# Patient Record
Sex: FEMALE | Race: BLACK OR AFRICAN AMERICAN | Employment: UNEMPLOYED | URBAN - METROPOLITAN AREA
[De-identification: names, ages, dates, MRNs, and addresses within clinical notes are randomized per-mention and may not be internally consistent; named-entity substitution may affect disease eponyms.]

---

## 2018-06-03 ENCOUNTER — HOSPITAL ENCOUNTER (EMERGENCY)
Age: 58
Discharge: HOME | End: 2018-06-03
Attending: EMERGENCY MEDICINE

## 2018-06-03 ENCOUNTER — APPOINTMENT (OUTPATIENT)
Dept: GENERAL RADIOLOGY | Age: 58
End: 2018-06-03

## 2018-06-03 VITALS
TEMPERATURE: 97.4 F | HEIGHT: 68 IN | OXYGEN SATURATION: 100 % | HEART RATE: 68 BPM | WEIGHT: 189.8 LBS | RESPIRATION RATE: 16 BRPM | SYSTOLIC BLOOD PRESSURE: 161 MMHG | DIASTOLIC BLOOD PRESSURE: 112 MMHG | BODY MASS INDEX: 28.76 KG/M2
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
## 2018-06-03 PROCEDURE — 72100 X-RAY EXAM L-S SPINE 2/3 VWS: CPT

## 2018-06-03 PROCEDURE — 70110 X-RAY EXAM OF JAW 4/> VIEWS: CPT

## 2018-06-03 PROCEDURE — 72040 X-RAY EXAM NECK SPINE 2-3 VW: CPT

## 2018-06-03 PROCEDURE — 99283 EMERGENCY DEPT VISIT LOW MDM: CPT

## 2018-06-03 PROCEDURE — 73030 X-RAY EXAM OF SHOULDER: CPT

## 2018-06-03 RX ORDER — CYCLOBENZAPRINE HCL 10 MG
10 TABLET ORAL 3 TIMES DAILY PRN
Qty: 20 TABLET | Refills: 0 | Status: SHIPPED | OUTPATIENT
Start: 2018-06-03 | End: 2018-06-11 | Stop reason: SDUPTHER

## 2018-06-03 RX ORDER — HYDROCODONE BITARTRATE AND ACETAMINOPHEN 5; 325 MG/1; MG/1
1 TABLET ORAL EVERY 8 HOURS PRN
Qty: 15 TABLET | Refills: 0 | Status: SHIPPED | OUTPATIENT
Start: 2018-06-03 | End: 2018-06-08

## 2018-06-03 ASSESSMENT — ENCOUNTER SYMPTOMS
COLOR CHANGE: 0
CONSTIPATION: 0
DIARRHEA: 0
VOMITING: 0
SORE THROAT: 0
COUGH: 0
SHORTNESS OF BREATH: 0
ABDOMINAL PAIN: 0
RHINORRHEA: 0
SINUS PRESSURE: 0
WHEEZING: 0
NAUSEA: 0

## 2018-06-03 ASSESSMENT — PAIN DESCRIPTION - LOCATION: LOCATION: FACE;NECK

## 2018-06-03 ASSESSMENT — PAIN SCALES - GENERAL: PAINLEVEL_OUTOF10: 8

## 2018-06-03 ASSESSMENT — PAIN DESCRIPTION - DESCRIPTORS: DESCRIPTORS: SORE;TIGHTNESS

## 2018-06-03 ASSESSMENT — PAIN DESCRIPTION - FREQUENCY: FREQUENCY: CONTINUOUS

## 2018-06-03 ASSESSMENT — PAIN DESCRIPTION - PAIN TYPE: TYPE: ACUTE PAIN

## 2018-06-11 ENCOUNTER — OFFICE VISIT (OUTPATIENT)
Dept: FAMILY MEDICINE CLINIC | Age: 58
End: 2018-06-11

## 2018-06-11 ENCOUNTER — HOSPITAL ENCOUNTER (OUTPATIENT)
Age: 58
Setting detail: SPECIMEN
Discharge: HOME | End: 2018-06-11
Payer: COMMERCIAL

## 2018-06-11 VITALS
WEIGHT: 190 LBS | HEART RATE: 75 BPM | BODY MASS INDEX: 28.79 KG/M2 | HEIGHT: 68 IN | SYSTOLIC BLOOD PRESSURE: 128 MMHG | DIASTOLIC BLOOD PRESSURE: 82 MMHG | TEMPERATURE: 97.2 F

## 2018-06-11 DIAGNOSIS — I10 ESSENTIAL HYPERTENSION: Primary | ICD-10-CM

## 2018-06-11 DIAGNOSIS — J30.89 CHRONIC NON-SEASONAL ALLERGIC RHINITIS, UNSPECIFIED TRIGGER: ICD-10-CM

## 2018-06-11 DIAGNOSIS — I10 ESSENTIAL HYPERTENSION: ICD-10-CM

## 2018-06-11 DIAGNOSIS — M62.838 MUSCLE SPASM: ICD-10-CM

## 2018-06-11 DIAGNOSIS — Z12.11 COLON CANCER SCREENING: ICD-10-CM

## 2018-06-11 DIAGNOSIS — Z86.39 HISTORY OF HYPERTHYROIDISM: ICD-10-CM

## 2018-06-11 DIAGNOSIS — Z13.1 DIABETES MELLITUS SCREENING: ICD-10-CM

## 2018-06-11 DIAGNOSIS — E55.9 VITAMIN D DEFICIENCY: ICD-10-CM

## 2018-06-11 LAB
ALBUMIN SERPL-MCNC: 4.1 G/DL (ref 3.5–5.2)
ALBUMIN/GLOBULIN RATIO: 1.4 (ref 1–2.5)
ALP BLD-CCNC: 93 U/L (ref 35–104)
ALT SERPL-CCNC: 16 U/L (ref 5–33)
ANION GAP SERPL CALCULATED.3IONS-SCNC: 13 MMOL/L (ref 9–17)
AST SERPL-CCNC: 16 U/L
BILIRUB SERPL-MCNC: 0.33 MG/DL (ref 0.3–1.2)
BUN BLDV-MCNC: 12 MG/DL (ref 6–20)
BUN/CREAT BLD: NORMAL (ref 9–20)
CALCIUM SERPL-MCNC: 9.2 MG/DL (ref 8.6–10.4)
CHLORIDE BLD-SCNC: 107 MMOL/L (ref 98–107)
CO2: 23 MMOL/L (ref 20–31)
CREAT SERPL-MCNC: 0.57 MG/DL (ref 0.5–0.9)
ESTIMATED AVERAGE GLUCOSE: 85 MG/DL
GFR AFRICAN AMERICAN: >60 ML/MIN
GFR NON-AFRICAN AMERICAN: >60 ML/MIN
GFR SERPL CREATININE-BSD FRML MDRD: NORMAL ML/MIN/{1.73_M2}
GFR SERPL CREATININE-BSD FRML MDRD: NORMAL ML/MIN/{1.73_M2}
GLUCOSE BLD-MCNC: 90 MG/DL (ref 70–99)
HBA1C MFR BLD: 4.6 % (ref 4–6)
POTASSIUM SERPL-SCNC: 4.1 MMOL/L (ref 3.7–5.3)
SODIUM BLD-SCNC: 143 MMOL/L (ref 135–144)
T3 TOTAL: 103 NG/DL (ref 80–200)
T4 TOTAL: 5.7 UG/DL (ref 4.5–12)
TOTAL PROTEIN: 7.1 G/DL (ref 6.4–8.3)
TSH SERPL DL<=0.05 MIU/L-ACNC: 0.73 MIU/L (ref 0.3–5)

## 2018-06-11 PROCEDURE — 99203 OFFICE O/P NEW LOW 30 MIN: CPT | Performed by: FAMILY MEDICINE

## 2018-06-11 RX ORDER — CHOLECALCIFEROL (VITAMIN D3) 50 MCG
2000 TABLET ORAL DAILY
Qty: 30 TABLET | Refills: 3 | Status: SHIPPED | OUTPATIENT
Start: 2018-06-11 | End: 2018-07-17 | Stop reason: SDUPTHER

## 2018-06-11 RX ORDER — HYDROCHLOROTHIAZIDE 12.5 MG/1
12.5 CAPSULE, GELATIN COATED ORAL DAILY
Qty: 30 CAPSULE | Refills: 3 | Status: SHIPPED | OUTPATIENT
Start: 2018-06-11 | End: 2019-09-19 | Stop reason: SDUPTHER

## 2018-06-11 RX ORDER — IBUPROFEN 600 MG/1
600 TABLET ORAL EVERY 6 HOURS PRN
Qty: 30 TABLET | Refills: 0 | Status: SHIPPED | OUTPATIENT
Start: 2018-06-11 | End: 2018-07-17 | Stop reason: SDUPTHER

## 2018-06-11 RX ORDER — FLUTICASONE PROPIONATE 50 MCG
1 SPRAY, SUSPENSION (ML) NASAL DAILY
Qty: 1 BOTTLE | Refills: 3 | Status: SHIPPED | OUTPATIENT
Start: 2018-06-11 | End: 2020-06-02 | Stop reason: ALTCHOICE

## 2018-06-11 RX ORDER — CYCLOBENZAPRINE HCL 10 MG
10 TABLET ORAL 3 TIMES DAILY PRN
Qty: 20 TABLET | Refills: 0 | Status: SHIPPED | OUTPATIENT
Start: 2018-06-11 | End: 2018-06-21

## 2018-06-11 ASSESSMENT — PATIENT HEALTH QUESTIONNAIRE - PHQ9
2. FEELING DOWN, DEPRESSED OR HOPELESS: 0
SUM OF ALL RESPONSES TO PHQ QUESTIONS 1-9: 0
SUM OF ALL RESPONSES TO PHQ9 QUESTIONS 1 & 2: 0
1. LITTLE INTEREST OR PLEASURE IN DOING THINGS: 0

## 2018-06-11 ASSESSMENT — ENCOUNTER SYMPTOMS
COUGH: 0
WHEEZING: 0
BACK PAIN: 1
ABDOMINAL PAIN: 0
SHORTNESS OF BREATH: 0

## 2018-07-17 ENCOUNTER — HOSPITAL ENCOUNTER (OUTPATIENT)
Age: 58
Setting detail: SPECIMEN
Discharge: HOME | End: 2018-07-17
Payer: COMMERCIAL

## 2018-07-17 ENCOUNTER — OFFICE VISIT (OUTPATIENT)
Dept: FAMILY MEDICINE CLINIC | Age: 58
End: 2018-07-17

## 2018-07-17 VITALS
SYSTOLIC BLOOD PRESSURE: 128 MMHG | WEIGHT: 184.6 LBS | BODY MASS INDEX: 27.98 KG/M2 | TEMPERATURE: 97.3 F | HEIGHT: 68 IN | HEART RATE: 66 BPM | DIASTOLIC BLOOD PRESSURE: 85 MMHG

## 2018-07-17 DIAGNOSIS — E55.9 VITAMIN D DEFICIENCY: ICD-10-CM

## 2018-07-17 DIAGNOSIS — Z00.00 HEALTHCARE MAINTENANCE: ICD-10-CM

## 2018-07-17 DIAGNOSIS — I10 ESSENTIAL HYPERTENSION: Primary | ICD-10-CM

## 2018-07-17 LAB
HIV AG/AB: NONREACTIVE
VITAMIN D 25-HYDROXY: 19 NG/ML (ref 30–100)

## 2018-07-17 PROCEDURE — 99213 OFFICE O/P EST LOW 20 MIN: CPT | Performed by: STUDENT IN AN ORGANIZED HEALTH CARE EDUCATION/TRAINING PROGRAM

## 2018-07-17 RX ORDER — IBUPROFEN 600 MG/1
600 TABLET ORAL EVERY 6 HOURS PRN
Qty: 30 TABLET | Refills: 0 | Status: SHIPPED | OUTPATIENT
Start: 2018-07-17 | End: 2018-09-05

## 2018-07-17 RX ORDER — CHOLECALCIFEROL (VITAMIN D3) 50 MCG
2000 TABLET ORAL DAILY
Qty: 30 TABLET | Refills: 3 | Status: SHIPPED | OUTPATIENT
Start: 2018-07-17 | End: 2020-12-03

## 2018-07-17 ASSESSMENT — ENCOUNTER SYMPTOMS
COUGH: 0
ABDOMINAL PAIN: 0
NAUSEA: 0
SHORTNESS OF BREATH: 0
VOMITING: 0

## 2018-07-17 NOTE — PATIENT INSTRUCTIONS
Thank you for letting us take care of you today. We hope all your questions were addressed. If a question was overlooked or something else comes to mind after you return home, please contact a member of your Care Team listed below. Please make sure you have a routine office visit set up to follow-up on [address]. Your Care Team at [place] [address] is Team #3  Emily Quan MD (Faculty)  Daria Adame MD (Faculty  Cletahir Bhardwaj MD (Resident)  Darius Perez MD (Resident)   Anna Yan MD (Resident)  Socorro Valerio MD (Resident)  Joanne Figueroa MD (Resident)  BREN Waller., RADHA Marsh., Aryan Quinn ([facility])  Cristobal Ochoa RN, ([address] [facility])  Roselyn Shi, Ph.D., (Behavioral Services)  Gabriel Hyde, [address] [place] [address] (Clinical Pharmacist)     Office phone number: [phone]    If you need to get in right away due to illness, please be advised we have \"Same Day\" appointments available Monday-Friday. Please call us at [phone] option #3 to schedule your \"Same Day\" appointment.

## 2018-07-17 NOTE — PROGRESS NOTES
1-2x /month    Drug use: No    Sexual activity: Not Currently     Other Topics Concern    Not on file     Social History Narrative    No narrative on file       Objective:      /85 (Site: Right Arm, Position: Sitting, Cuff Size: Medium Adult) Comment: manual  Pulse 66   Temp 97.3 °F (36.3 °C) (Oral)   Ht 5' 8\" (1.727 m)   Wt 184 lb 9.6 oz (83.7 kg)   LMP 12/31/2013   BMI 28.07 kg/m²    BP Readings from Last 3 Encounters:   07/17/18 128/85   06/11/18 128/82   06/03/18 (!) 161/112       Physical Exam    General Appearance:  A&Ox3, NAD  HEENT: Head is NC/AT. Eyes sclera anicteric, no discharge. Ears intact TM B/L, NL external ear, no discharge. Nose NL nasal mucosa without swelling. Throat no tonsillar enlargement   Neck: Supple, no LAD, no thyromegaly  Lungs:  Clear to auscultation B/L. Cardiovascular:  NL S1/S2, RRR, no m,g,r. Abdomen: + BS, Soft, nontender, nondistended, no masses or organomegaly  Extremities: No cyanosis, clubbing or edema  Psych: NL affect     Assessment:     1. Essential hypertension    2. Vitamin D deficiency    3. Healthcare maintenance        Plan:      1. Essential hypertension  - Well controlled, continue current therapy     2. Vitamin D deficiency  - Cholecalciferol (VITAMIN D) 2000 units TABS tablet; Take 1 tablet by mouth daily  Dispense: 30 tablet; Refill: 3  - Vitamin D 25 Hydroxy; Future    3. Healthcare maintenance  - [facility] Digital Screen Bilateral [id number]; Future  - POCT FECAL IMMUNOCHEMICAL TEST (FIT); Future  - HIV Screen; Future      Tor Docker received counseling on the following healthy behaviors: nutrition, exercise and medication adherence  Reviewed prior labs and health maintenance  Continue current medications, diet and exercise. Discussed use, benefit, and side effects of prescribed medications. Barriers to medication compliance addressed.    Patient given educational materials - see patient instructions  Was a self-tracking handout given in paper form or via [employer]? No    Requested Prescriptions     Signed Prescriptions Disp Refills    ibuprofen (ADVIL;MOTRIN) 600 MG tablet 30 tablet 0     Sig: Take 1 tablet by mouth every 6 hours as needed for Pain    Cholecalciferol (VITAMIN D) 2000 units TABS tablet 30 tablet 3     Sig: Take 1 tablet by mouth daily       All patient questions answered. Patient voiced understanding. Quality Measures    Body mass index is 28.07 kg/m². Normal. Weight control planned discussed Healthy diet and regular exercise. BP: 128/85 (manual) Blood pressure is normal. Treatment plan consists of No treatment change needed. Lab Results   Component Value Date    LDLCHOLESTEROL 109 07/07/2015    (goal LDL reduction with dx if diabetes is 50% LDL reduction)      PHQ Scores 6/11/2018   PHQ2 Score 0   PHQ9 Score 0     Interpretation of Total Score Depression Severity: 1-4 = Minimal depression, 5-9 = Mild depression, 10-14 = Moderate depression, 15-19 = Moderately severe depression, 20-27 = Severe depression    Requested Prescriptions     Signed Prescriptions Disp Refills    ibuprofen (ADVIL;MOTRIN) 600 MG tablet 30 tablet 0     Sig: Take 1 tablet by mouth every 6 hours as needed for Pain    Cholecalciferol (VITAMIN D) 2000 units TABS tablet 30 tablet 3     Sig: Take 1 tablet by mouth daily       Medications Discontinued During This Encounter   Medication Reason    ibuprofen (ADVIL;MOTRIN) 600 MG tablet REORDER    Cholecalciferol (VITAMIN D) 2000 units TABS tablet Priscilalynnette Millard received counseling on the following healthy behaviors: nutrition, exercise and medication adherence    Discussed use, benefit, and side effects of prescribed medications. Barriers to medication compliance addressed. All patient questions answered. Pt voiced understanding. Return in about 6 months (around 1/17/2019) for HTN.

## 2018-07-17 NOTE — PROGRESS NOTES
Subjective:      Patient ID: Pauline Mercedes is a 62 y.o. female.     HPI    Review of Systems    Objective:   Physical Exam    Assessment:      ***      Plan:      ***

## 2018-07-17 NOTE — PROGRESS NOTES
Attending Physician Statement  I  have discussed the care of Eliseo Castaneda including pertinent history and exam findings with the resident. I agree with the assessment, plan and orders as documented by the resident. /85 (Site: Right Arm, Position: Sitting, Cuff Size: Medium Adult) Comment: manual  Pulse 66   Temp 97.3 °F (36.3 °C) (Oral)   Ht 5' 8\" (1.727 m)   Wt 184 lb 9.6 oz (83.7 kg)   LMP 12/31/2013   BMI 28.07 kg/m²    BP Readings from Last 3 Encounters:   07/17/18 128/85   06/11/18 128/82   06/03/18 (!) 161/112     Wt Readings from Last 3 Encounters:   07/17/18 184 lb 9.6 oz (83.7 kg)   06/11/18 190 lb (86.2 kg)   06/03/18 189 lb 12.8 oz (86.1 kg)          Diagnosis Orders   1. Essential hypertension     2. Vitamin D deficiency  Cholecalciferol (VITAMIN D) 2000 units TABS tablet    Vitamin D 25 Hydroxy   3. Healthcare maintenance  [facility] Digital Screen Bilateral [[id number]]    POCT FECAL IMMUNOCHEMICAL TEST (FIT)    HIV Screen       See orders. BP stable  HM addressed    Recheck office visit - 6 mo.      Sarahi Amezcua DO 7/17/2018 4:21 PM

## 2018-08-23 ENCOUNTER — TELEPHONE (OUTPATIENT)
Dept: FAMILY MEDICINE CLINIC | Age: 58
End: 2018-08-23

## 2018-08-23 NOTE — TELEPHONE ENCOUNTER
Patient came into office requesting medical statement for EOPA stating her health condition she would benefit from having a fan. Letter must be written on a letter head. Pt needs letter by tomorrow Friday 08 24 18, before 1:00PM. Please call pt when ready for pick-up , Thank you. Next Visit Date:  No future appointments.     Health Maintenance   Topic Date Due    Hepatitis C screen  1960    DTaP/Tdap/Td vaccine (1 - Tdap) 12/30/1979    Shingles Vaccine (1 of 2 - 2 Dose Series) 12/30/2010    Colon cancer screen colonoscopy  12/30/2010    Cervical cancer screen  11/13/2016    Breast cancer screen  07/07/2017    Flu vaccine (1) 09/01/2018    Potassium monitoring  06/11/2019    Creatinine monitoring  06/11/2019    Lipid screen  07/07/2020    HIV screen  Completed       Hemoglobin A1C (%)   Date Value   06/11/2018 4.6   07/07/2015 5.0   01/04/2014 4.7             ( goal A1C is < 7)   No results found for: LABMICR  LDL Cholesterol (mg/dL)   Date Value   07/07/2015 109   01/04/2014 133 (H)       (goal LDL is <100)   AST (U/L)   Date Value   06/11/2018 16     ALT (U/L)   Date Value   06/11/2018 16     BUN (mg/dL)   Date Value   06/11/2018 12     BP Readings from Last 3 Encounters:   07/17/18 128/85   06/11/18 128/82   06/03/18 (!) 161/112          (goal 120/80)    All Future Testing planned in [facility]  Lab Frequency Next Occurrence   [facility] Digital Screen Bilateral [[id number]] Once 01/17/2019   POCT FECAL IMMUNOCHEMICAL TEST (FIT) Once 07/17/2019               Patient Active Problem List:     Smoking     Hypertension     Uterine fibroid     Menorrhagia

## 2018-09-05 ENCOUNTER — APPOINTMENT (OUTPATIENT)
Dept: GENERAL RADIOLOGY | Age: 58
End: 2018-09-05
Payer: MEDICAID

## 2018-09-05 ENCOUNTER — HOSPITAL ENCOUNTER (EMERGENCY)
Age: 58
Discharge: HOME | End: 2018-09-05
Attending: EMERGENCY MEDICINE
Payer: MEDICAID

## 2018-09-05 ENCOUNTER — APPOINTMENT (OUTPATIENT)
Dept: CT IMAGING | Age: 58
End: 2018-09-05
Payer: MEDICAID

## 2018-09-05 VITALS
OXYGEN SATURATION: 99 % | RESPIRATION RATE: 14 BRPM | WEIGHT: 183.6 LBS | HEIGHT: 68 IN | HEART RATE: 70 BPM | BODY MASS INDEX: 27.83 KG/M2 | TEMPERATURE: 98.2 F | SYSTOLIC BLOOD PRESSURE: 146 MMHG | DIASTOLIC BLOOD PRESSURE: 92 MMHG

## 2018-09-05 DIAGNOSIS — S00.83XA FACIAL CONTUSION, INITIAL ENCOUNTER: ICD-10-CM

## 2018-09-05 DIAGNOSIS — M54.41 ACUTE LOW BACK PAIN WITH RIGHT-SIDED SCIATICA, UNSPECIFIED BACK PAIN LATERALITY: ICD-10-CM
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
## 2018-09-05 DIAGNOSIS — S80.811A ABRASION OF RIGHT LOWER EXTREMITY, INITIAL ENCOUNTER: ICD-10-CM

## 2018-09-05 DIAGNOSIS — S80.11XA CONTUSION OF RIGHT LOWER LEG, INITIAL ENCOUNTER: ICD-10-CM

## 2018-09-05 PROCEDURE — 99284 EMERGENCY DEPT VISIT MOD MDM: CPT

## 2018-09-05 PROCEDURE — 73590 X-RAY EXAM OF LOWER LEG: CPT

## 2018-09-05 PROCEDURE — 72100 X-RAY EXAM L-S SPINE 2/3 VWS: CPT

## 2018-09-05 PROCEDURE — 70486 CT MAXILLOFACIAL W/O DYE: CPT

## 2018-09-05 RX ORDER — IBUPROFEN 600 MG/1
600 TABLET ORAL EVERY 8 HOURS PRN
Qty: 15 TABLET | Refills: 0 | Status: SHIPPED | OUTPATIENT
Start: 2018-09-05 | End: 2020-06-02 | Stop reason: ALTCHOICE

## 2018-09-05 RX ORDER — CYCLOBENZAPRINE HCL 10 MG
10 TABLET ORAL 3 TIMES DAILY PRN
Qty: 20 TABLET | Refills: 0 | Status: SHIPPED | OUTPATIENT
Start: 2018-09-05 | End: 2018-09-15

## 2018-09-05 ASSESSMENT — PAIN DESCRIPTION - ORIENTATION: ORIENTATION: RIGHT

## 2018-09-05 ASSESSMENT — ENCOUNTER SYMPTOMS
PHOTOPHOBIA: 0
ABDOMINAL PAIN: 0
COLOR CHANGE: 0
FACIAL SWELLING: 1
EYE PAIN: 0
BACK PAIN: 1
SHORTNESS OF BREATH: 0
COUGH: 0
NAUSEA: 0
VOMITING: 0

## 2018-09-05 ASSESSMENT — PAIN SCALES - GENERAL: PAINLEVEL_OUTOF10: 8

## 2018-09-05 ASSESSMENT — PAIN DESCRIPTION - PAIN TYPE: TYPE: ACUTE PAIN
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
## 2019-09-19 DIAGNOSIS — I10 ESSENTIAL HYPERTENSION: ICD-10-CM

## 2019-09-23 RX ORDER — HYDROCHLOROTHIAZIDE 12.5 MG/1
CAPSULE, GELATIN COATED ORAL
Qty: 30 CAPSULE | Refills: 3 | Status: SHIPPED | OUTPATIENT
Start: 2019-09-23 | End: 2019-12-05 | Stop reason: SDUPTHER

## 2019-12-04 DIAGNOSIS — I10 ESSENTIAL HYPERTENSION: ICD-10-CM

## 2019-12-05 RX ORDER — HYDROCHLOROTHIAZIDE 12.5 MG/1
CAPSULE, GELATIN COATED ORAL
Qty: 30 CAPSULE | Refills: 3 | Status: SHIPPED | OUTPATIENT
Start: 2019-12-05 | End: 2020-06-02 | Stop reason: ALTCHOICE

## 2020-06-02 ENCOUNTER — OFFICE VISIT (OUTPATIENT)
Dept: FAMILY MEDICINE CLINIC | Age: 60
End: 2020-06-02

## 2020-06-02 VITALS
TEMPERATURE: 96.9 F | BODY MASS INDEX: 29.7 KG/M2 | DIASTOLIC BLOOD PRESSURE: 117 MMHG | HEART RATE: 84 BPM | SYSTOLIC BLOOD PRESSURE: 177 MMHG | WEIGHT: 196 LBS | HEIGHT: 68 IN

## 2020-06-02 PROCEDURE — 99213 OFFICE O/P EST LOW 20 MIN: CPT | Performed by: STUDENT IN AN ORGANIZED HEALTH CARE EDUCATION/TRAINING PROGRAM

## 2020-06-02 RX ORDER — HYDROCHLOROTHIAZIDE 25 MG/1
25 TABLET ORAL EVERY MORNING
Qty: 30 TABLET | Refills: 3 | Status: SHIPPED | OUTPATIENT
Start: 2020-06-02 | End: 2020-10-13

## 2020-06-02 RX ORDER — HYDROCHLOROTHIAZIDE 12.5 MG/1
CAPSULE, GELATIN COATED ORAL
Qty: 30 CAPSULE | Refills: 3 | Status: CANCELLED | OUTPATIENT
Start: 2020-06-02

## 2020-06-02 ASSESSMENT — ENCOUNTER SYMPTOMS
ABDOMINAL PAIN: 0
COUGH: 0
SHORTNESS OF BREATH: 0

## 2020-06-02 ASSESSMENT — PATIENT HEALTH QUESTIONNAIRE - PHQ9
SUM OF ALL RESPONSES TO PHQ9 QUESTIONS 1 & 2: 0
SUM OF ALL RESPONSES TO PHQ QUESTIONS 1-9: 0
1. LITTLE INTEREST OR PLEASURE IN DOING THINGS: 0
SUM OF ALL RESPONSES TO PHQ QUESTIONS 1-9: 0
2. FEELING DOWN, DEPRESSED OR HOPELESS: 0

## 2020-06-02 NOTE — PROGRESS NOTES
Attending Physician Statement  I have discussed the care of [address] pertinent history and exam findings,  with the resident. I have reviewed the key elements of all parts of the encounter with the resident. I agree with the assessment, plan and orders as documented by the resident. (GE Modifier)    HTN- HCTZ 25 mg restarted/ NV in 1 week.   HM- ordered
organomegaly  Extremities: No cyanosis, clubbing or edema    Assessment:     1. Essential hypertension    2. Diabetes mellitus screening    3. Colon cancer screening        Plan:      1. Essential hypertension  - Basic Metabolic Panel  - hydroCHLOROthiazide (HYDRODIURIL) 25 MG tablet; Take 1 tablet by mouth every morning  Dispense: 30 tablet; Refill: 3  -Discussed with patient need for adding second medication. At this time patient would like to hold off. She reports she will increase her hydrochlorothiazide return in 4 weeks and at that time if blood pressure still elevated she is agreeable to add a medication. Consider adding Norvasc. 2. Diabetes mellitus screening  - Hemoglobin A1C; Future    3. Colon cancer screening  - Cologuard (For External Results Only); Future      Porsha Parson received counseling on the following healthy behaviors: nutrition, exercise and medication adherence  Reviewed prior labs and health maintenance  Continue current medications, diet and exercise. Discussed use, benefit, and side effects of prescribed medications. Barriers to medication compliance addressed. Patient given educational materials - see patient instructions  Was a self-tracking handout given in paper form or via [employer]? No    Requested Prescriptions     Signed Prescriptions Disp Refills    hydroCHLOROthiazide (HYDRODIURIL) 25 MG tablet 30 tablet 3     Sig: Take 1 tablet by mouth every morning       All patient questions answered. Patient voiced understanding. Quality Measures    Body mass index is 29.8 kg/m². Elevated. Weight control planned discussed Healthy diet and regular exercise. BP: (!) 177/117 Blood pressure is high. Treatment plan consists of Antihypertensive Medication Increased.     Lab Results   Component Value Date    LDLCHOLESTEROL 109 07/07/2015    (goal LDL reduction with dx if diabetes is 50% LDL reduction)      PHQ Scores 6/2/2020 6/11/2018   PHQ2 Score 0 0   PHQ9 Score 0 0

## 2020-06-30 ENCOUNTER — OFFICE VISIT (OUTPATIENT)
Dept: FAMILY MEDICINE CLINIC | Age: 60
End: 2020-06-30

## 2020-06-30 VITALS
TEMPERATURE: 94 F | SYSTOLIC BLOOD PRESSURE: 128 MMHG | DIASTOLIC BLOOD PRESSURE: 82 MMHG | WEIGHT: 191 LBS | BODY MASS INDEX: 28.95 KG/M2 | HEIGHT: 68 IN | HEART RATE: 86 BPM

## 2020-06-30 PROCEDURE — 99213 OFFICE O/P EST LOW 20 MIN: CPT | Performed by: STUDENT IN AN ORGANIZED HEALTH CARE EDUCATION/TRAINING PROGRAM

## 2020-06-30 RX ORDER — ANTACID TABLETS 648 MG/1
2 TABLET, CHEWABLE ORAL 2 TIMES DAILY
Qty: 120 TABLET | Refills: 5 | Status: SHIPPED | OUTPATIENT
Start: 2020-06-30 | End: 2020-12-27

## 2020-06-30 RX ORDER — PHENYLEPHRINE HYDROCHLORIDE 10 MG/1
1 TABLET, COATED ORAL NIGHTLY
Qty: 2 EACH | Refills: 0 | Status: SHIPPED | OUTPATIENT
Start: 2020-06-30 | End: 2022-09-22

## 2020-06-30 ASSESSMENT — ENCOUNTER SYMPTOMS
VOMITING: 0
SHORTNESS OF BREATH: 0
CHEST TIGHTNESS: 0
ABDOMINAL PAIN: 0
CONSTIPATION: 0
NAUSEA: 0
SORE THROAT: 0
DIARRHEA: 0
COUGH: 0

## 2020-06-30 NOTE — PROGRESS NOTES
(Temporal)   Ht 5' 7.99\" (1.727 m)   Wt 191 lb (86.6 kg)   LMP 12/31/2013   BMI 29.05 kg/m²    BP Readings from Last 3 Encounters:   06/30/20 128/82   06/02/20 (!) 177/117   09/05/18 (!) 146/92     Physical Exam  Vitals signs and nursing note reviewed. Constitutional:       Appearance: She is well-developed. Cardiovascular:      Rate and Rhythm: Normal rate and regular rhythm. Heart sounds: Normal heart sounds. No murmur. No friction rub. No gallop. Pulmonary:      Effort: Pulmonary effort is normal. No respiratory distress. Breath sounds: Normal breath sounds. No wheezing or rales. Chest:      Chest wall: No tenderness. Abdominal:      General: Bowel sounds are normal. There is no distension. Palpations: Abdomen is soft. There is no mass. Tenderness: There is no abdominal tenderness. There is no guarding. Musculoskeletal:      Comments: Negative Tinel and Phalen sign   Skin:     Capillary Refill: Capillary refill takes less than 2 seconds. Neurological:      Mental Status: She is alert and oriented to person, place, and time. Lab Results   Component Value Date    WBC 7.1 07/07/2015    HGB 12.6 07/07/2015    HCT 38.9 07/07/2015     07/07/2015    CHOL 222 (H) 07/07/2015    TRIG 53 07/07/2015     07/07/2015    ALT 16 06/11/2018    AST 16 06/11/2018     06/11/2018    K 4.1 06/11/2018     06/11/2018    CREATININE 0.57 06/11/2018    BUN 12 06/11/2018    CO2 23 06/11/2018    TSH 0.73 06/11/2018    LABA1C 4.6 06/11/2018     Lab Results   Component Value Date    CALCIUM 9.2 06/11/2018     Lab Results   Component Value Date    LDLCHOLESTEROL 109 07/07/2015       Assessment and Plan:    1. Essential hypertension  -Continue with medication as prescribed  -Continue with diet devoid of fried and fatty foods.  -Continue with daily exercise, 30 minutes moderate intensity 5 times a week.     2. Vitamin D deficiency  - calcium carbonate 648 MG TABS; Take 2 tablets by mouth 2 times daily  Dispense: 120 tablet; Refill: 5    3. Screening for hyperlipidemia  - Lipid Panel; Future    4. Encounter for screening mammogram for breast cancer  - [facility] Digital Screen Bilateral [[id number]]; Future    5. Need for prophylactic vaccination and inoculation against varicella  - zoster recombinant adjuvanted vaccine [facility]) 50 MCG/0.5ML SUSR injection; Inject 0.5 mLs into the muscle once for 1 dose 50 MCG IM then repeat 2-6 months. Dispense: 1 each; Refill: 1    6. Bilateral carpal tunnel syndrome  - Elastic Bandages & Supports (CARPAL TUNNEL WRIST STABILIZER) MISC; 1 Package by Does not apply route nightly  Dispense: 2 each; Refill: 0          Requested Prescriptions     Signed Prescriptions Disp Refills    zoster recombinant adjuvanted vaccine (SHINGRIX) 50 MCG/0.5ML SUSR injection 1 each 1     Sig: Inject 0.5 mLs into the muscle once for 1 dose 50 MCG IM then repeat 2-6 months.  calcium carbonate 648 MG TABS 120 tablet 5     Sig: Take 2 tablets by mouth 2 times daily    Elastic Bandages & Supports (CARPAL TUNNEL WRIST STABILIZER) MISC 2 each 0     Si Package by Does not apply route nightly       There are no discontinued medications. Return in about 2 months (around 2020) for HTN F/U. Blake Soto received counseling on the following healthy behaviors: nutrition and exercise  Reviewed prior labs and health maintenance  Continue current medications, diet and exercise. Discussed use, benefit, and side effects of prescribed medications. Barriers to medication compliance addressed. Patient given educational materials - see patient instructions  Was a self-tracking handout given in paper form or via [employer]? No:     Requested Prescriptions     Signed Prescriptions Disp Refills    zoster recombinant adjuvanted vaccine (SHINGRIX) 50 MCG/0.5ML SUSR injection 1 each 1     Sig: Inject 0.5 mLs into the muscle once for 1 dose 50 MCG IM then repeat 2-6 months.     calcium

## 2020-10-12 NOTE — TELEPHONE ENCOUNTER
Last visit: 06/30/20  Last Med refill:   Does patient have enough medication for 72 hours:     Next Visit Date:  No future appointments.     Health Maintenance   Topic Date Due    Hepatitis C screen  1960    DTaP/Tdap/Td vaccine (1 - Tdap) 12/30/1979    Shingles Vaccine (1 of 2) 12/30/2010    Colon cancer screen colonoscopy  12/30/2010    Breast cancer screen  07/07/2017    Potassium monitoring  06/11/2019    Creatinine monitoring  06/11/2019    Lipid screen  07/07/2020    Flu vaccine (1) 09/01/2020    Cervical cancer screen  06/30/2021 (Originally 11/13/2016)    HIV screen  Completed    Hepatitis A vaccine  Aged Out    Hepatitis B vaccine  Aged Out    Hib vaccine  Aged Out    Meningococcal (ACWY) vaccine  Aged Out    Pneumococcal 0-64 years Vaccine  Aged Out       Hemoglobin A1C (%)   Date Value   06/11/2018 4.6   07/07/2015 5.0   01/04/2014 4.7             ( goal A1C is < 7)   No results found for: LABMICR  LDL Cholesterol (mg/dL)   Date Value   07/07/2015 109   01/04/2014 133 (H)       (goal LDL is <100)   AST (U/L)   Date Value   06/11/2018 16     ALT (U/L)   Date Value   06/11/2018 16     BUN (mg/dL)   Date Value   06/11/2018 12     BP Readings from Last 3 Encounters:   06/30/20 128/82   06/02/20 (!) 177/117   09/05/18 (!) 146/92          (goal 120/80)    All Future Testing planned in [facility]  Lab Frequency Next Occurrence   Hemoglobin A1C Once 06/02/2021   Cologuard (For External Results Only) Once 06/02/2021   [facility] Digital Screen Bilateral [[id number]] Once 06/30/2021   Lipid Panel Once 06/30/2021               Patient Active Problem List:     Smoking     Hypertension     Uterine fibroid     Menorrhagia

## 2020-10-13 RX ORDER — HYDROCHLOROTHIAZIDE 25 MG/1
TABLET ORAL
Qty: 30 TABLET | Refills: 0 | Status: SHIPPED | OUTPATIENT
Start: 2020-10-13 | End: 2020-11-27 | Stop reason: SDUPTHER

## 2020-10-15 ENCOUNTER — TELEPHONE (OUTPATIENT)
Dept: FAMILY MEDICINE CLINIC | Age: 60
End: 2020-10-15

## 2020-11-27 NOTE — TELEPHONE ENCOUNTER
Patient is requesting a refill to last at least until her appointment on Thursday for her blood pressure medication. That she is completely out and has been out of the medication for about a week now. She was able to verify the name and dosage of the medication. Would like a call if that can be sent over to her pharmacy or not please.      Phone: [phone]    LOV: 06/30/2020  NOV: 12/03/2020    [facility] on [id number].

## 2020-11-30 RX ORDER — HYDROCHLOROTHIAZIDE 25 MG/1
TABLET ORAL
Qty: 30 TABLET | Refills: 5 | Status: SHIPPED | OUTPATIENT
Start: 2020-11-30 | End: 2020-12-02 | Stop reason: SDUPTHER

## 2020-12-02 RX ORDER — HYDROCHLOROTHIAZIDE 25 MG/1
TABLET ORAL
Qty: 30 TABLET | Refills: 5 | Status: SHIPPED | OUTPATIENT
Start: 2020-12-02 | End: 2021-08-30 | Stop reason: SDUPTHER

## 2020-12-02 NOTE — TELEPHONE ENCOUNTER
Last visit:   Last Med refill:   Does patient have enough medication for 72 hours: No:     Next Visit Date:  Future Appointments   Date Time Provider Jose R Ortiz   12/3/2020  8:30 AM Sonia Hatch MD [address] Maintenance   Topic Date Due    Hepatitis C screen  1960    DTaP/Tdap/Td vaccine (1 - Tdap) 12/30/1979    Shingles Vaccine (1 of 2) 12/30/2010    Colon cancer screen colonoscopy  12/30/2010    Breast cancer screen  07/07/2017    Potassium monitoring  06/11/2019    Creatinine monitoring  06/11/2019    Lipid screen  07/07/2020    Flu vaccine (1) 09/01/2020    Cervical cancer screen  06/30/2021 (Originally 11/13/2016)    HIV screen  Completed    Hepatitis A vaccine  Aged Out    Hepatitis B vaccine  Aged Out    Hib vaccine  Aged Out    Meningococcal (ACWY) vaccine  Aged Out    Pneumococcal 0-64 years Vaccine  Aged Out       Hemoglobin A1C (%)   Date Value   06/11/2018 4.6   07/07/2015 5.0   01/04/2014 4.7             ( goal A1C is < 7)   No results found for: LABMICR  LDL Cholesterol (mg/dL)   Date Value   07/07/2015 109   01/04/2014 133 (H)       (goal LDL is <100)   AST (U/L)   Date Value   06/11/2018 16     ALT (U/L)   Date Value   06/11/2018 16     BUN (mg/dL)   Date Value   06/11/2018 12     BP Readings from Last 3 Encounters:   06/30/20 128/82   06/02/20 (!) 177/117   09/05/18 (!) 146/92          (goal 120/80)    All Future Testing planned in [facility]  Lab Frequency Next Occurrence   Hemoglobin A1C Once 06/02/2021   Cologuard (For External Results Only) Once 06/02/2021   [facility] Digital Screen Bilateral [[id number]] Once 06/30/2021   Lipid Panel Once 06/30/2021               Patient Active Problem List:     Smoking     Hypertension     Uterine fibroid     Menorrhagia           Please address the medication refill and close the encounter. If I can be of assistance, please route to the applicable pool. Thank you.

## 2020-12-03 ENCOUNTER — OFFICE VISIT (OUTPATIENT)
Dept: FAMILY MEDICINE CLINIC | Age: 60
End: 2020-12-03
Payer: MEDICAID

## 2020-12-03 VITALS
SYSTOLIC BLOOD PRESSURE: 130 MMHG | WEIGHT: 192.6 LBS | BODY MASS INDEX: 29.29 KG/M2 | DIASTOLIC BLOOD PRESSURE: 88 MMHG | TEMPERATURE: 96.8 F | HEART RATE: 87 BPM

## 2020-12-03 PROCEDURE — 99213 OFFICE O/P EST LOW 20 MIN: CPT | Performed by: STUDENT IN AN ORGANIZED HEALTH CARE EDUCATION/TRAINING PROGRAM

## 2020-12-03 PROCEDURE — 99211 OFF/OP EST MAY X REQ PHY/QHP: CPT | Performed by: STUDENT IN AN ORGANIZED HEALTH CARE EDUCATION/TRAINING PROGRAM

## 2020-12-03 RX ORDER — SULFAMETHOXAZOLE AND TRIMETHOPRIM 800; 160 MG/1; MG/1
1 TABLET ORAL 2 TIMES DAILY
Qty: 20 TABLET | Refills: 0 | Status: SHIPPED | OUTPATIENT
Start: 2020-12-03 | End: 2020-12-13

## 2020-12-03 ASSESSMENT — ENCOUNTER SYMPTOMS
SINUS PRESSURE: 1
SHORTNESS OF BREATH: 0
COUGH: 0
BACK PAIN: 0
SORE THROAT: 0

## 2020-12-03 NOTE — PATIENT INSTRUCTIONS
Thank you for letting us take care of you today. We hope all your questions were addressed. If a question was overlooked or something else comes to mind after you return home, please contact a member of your Care Team listed below. Your Care Team at [facility] is Team #3  Kathleen Christine MD (Faculty)  Jad Medel MD (Faculty  Nataliayao Vital MD (Resident)  Jaleesa Herrera (Resident)   Michelle Wooten MD (Resident)  BREN Alvarez., RADHA Cifuentes., RADHA Tadeo ([address])  Tuanfred Marcus, [facility] ([address] [facility])    Vida Tenorio, [facility] (Clinical Pharmacist)     Office phone number: [phone]    If you need to get in right away due to illness, please be advised we have \"Same Day\" appointments available Monday-Friday. Please call us at [phone] option #3 to schedule your \"Same Day\" appointment.

## 2020-12-03 NOTE — PROGRESS NOTES
weakness. Review of Systems   Constitutional: Negative for chills, fatigue and fever. HENT: Positive for congestion and sinus pressure. Negative for hearing loss, sneezing, sore throat and tinnitus. Eyes: Negative for visual disturbance. Respiratory: Negative for cough and shortness of breath. Cardiovascular: Negative for chest pain and palpitations. Musculoskeletal: Negative for back pain and neck pain. Neurological: Negative for syncope, weakness, numbness and headaches. Objective:    /88 Comment: manually  Pulse 87   Temp 96.8 °F (36 °C) (Temporal)   Wt 192 lb 9.6 oz (87.4 kg)   LMP 12/31/2013   BMI 29.29 kg/m²    BP Readings from Last 3 Encounters:   12/03/20 130/88   06/30/20 128/82   06/02/20 (!) 177/117     Physical Exam  HENT:      Right Ear: Tympanic membrane, ear canal and external ear normal. There is no impacted cerumen. Nose: No congestion or rhinorrhea. Eyes:      Pupils: Pupils are equal, round, and reactive to light. Cardiovascular:      Rate and Rhythm: Normal rate and regular rhythm. Heart sounds: No murmur. No friction rub. No gallop. Pulmonary:      Breath sounds: No wheezing, rhonchi or rales. Musculoskeletal:         General: No signs of injury. Comments: Right arm- negative TTP. Normal ROM. Sensation intact   Skin:     Capillary Refill: Capillary refill takes less than 2 seconds. Neurological:      General: No focal deficit present. Mental Status: She is alert and oriented to person, place, and time.       Comments: Strength 5/5 throughout   Psychiatric:         Mood and Affect: Mood normal.           Lab Results   Component Value Date    WBC 7.1 07/07/2015    HGB 12.6 07/07/2015    HCT 38.9 07/07/2015     07/07/2015    CHOL 222 (H) 07/07/2015    TRIG 53 07/07/2015     07/07/2015    ALT 16 06/11/2018    AST 16 06/11/2018     06/11/2018    K 4.1 06/11/2018     06/11/2018    CREATININE 0.57 06/11/2018 exercise. BP: 130/88(manually) Blood pressure is normal. Treatment plan consists of No treatment change needed.     Lab Results   Component Value Date    LDLCHOLESTEROL 109 07/07/2015    (goal LDL reduction with dx if diabetes is 50% LDL reduction)      PHQ Scores 6/2/2020 6/11/2018   PHQ2 Score 0 0   PHQ9 Score 0 0     Interpretation of Total Score Depression Severity: 1-4 = Minimal depression, 5-9 = Mild depression, 10-14 = Moderate depression, 15-19 = Moderately severe depression, 20-27 = Severe depression

## 2020-12-03 NOTE — PROGRESS NOTES
Attending Physician Statement    Wt Readings from Last 3 Encounters:   12/03/20 192 lb 9.6 oz (87.4 kg)   06/30/20 191 lb (86.6 kg)   06/02/20 196 lb (88.9 kg)     Temp Readings from Last 3 Encounters:   12/03/20 96.8 °F (36 °C) (Temporal)   06/30/20 94 °F (34.4 °C) (Temporal)   06/02/20 96.9 °F (36.1 °C)     BP Readings from Last 3 Encounters:   12/03/20 130/88   06/30/20 128/82   06/02/20 (!) 177/117     Pulse Readings from Last 3 Encounters:   12/03/20 87   06/30/20 86   06/02/20 84       I have discussed the care of Kareem Hoffman  including pertinent history and exam findings,  with the resident. I have seen and examined the patient and the key elements of all parts of the encounter have been performed by me. I agree with the assessment, plan and orders as documented by the resident.   (GC Modifier)

## 2021-08-30 DIAGNOSIS — I10 ESSENTIAL HYPERTENSION: ICD-10-CM

## 2021-08-30 RX ORDER — HYDROCHLOROTHIAZIDE 25 MG/1
TABLET ORAL
Qty: 30 TABLET | Refills: 5 | Status: SHIPPED | OUTPATIENT
Start: 2021-08-30 | End: 2022-09-22

## 2021-08-30 NOTE — TELEPHONE ENCOUNTER
Please address the medication refill and close the encounter. If I can be of assistance, please route to the applicable pool. Thank you. Last visit: 12/03/2020  Last Med refill:   Does patient have enough medication for 72 hours: No: Left message for patient to call office to schedule and appointment. Next Visit Date:  No future appointments.     Health Maintenance   Topic Date Due    Hepatitis C screen  Never done    COVID-19 Vaccine (1) Never done    DTaP/Tdap/Td vaccine (1 - Tdap) Never done    Colon cancer screen colonoscopy  Never done    Shingles Vaccine (1 of 2) Never done    Cervical cancer screen  11/13/2016    Breast cancer screen  07/07/2017    Potassium monitoring  06/11/2019    Creatinine monitoring  06/11/2019    Lipid screen  07/07/2020    Flu vaccine (1) 09/01/2021    HIV screen  Completed    Hepatitis A vaccine  Aged Out    Hepatitis B vaccine  Aged Out    Hib vaccine  Aged Out    Meningococcal (ACWY) vaccine  Aged Out    Pneumococcal 0-64 years Vaccine  Aged Out       Hemoglobin A1C (%)   Date Value   06/11/2018 4.6   07/07/2015 5.0   01/04/2014 4.7             ( goal A1C is < 7)   No results found for: LABMICR  LDL Cholesterol (mg/dL)   Date Value   07/07/2015 109   01/04/2014 133 (H)       (goal LDL is <100)   AST (U/L)   Date Value   06/11/2018 16     ALT (U/L)   Date Value   06/11/2018 16     BUN (mg/dL)   Date Value   06/11/2018 12     BP Readings from Last 3 Encounters:   12/03/20 130/88   06/30/20 128/82   06/02/20 (!) 177/117          (goal 120/80)    All Future Testing planned in [facility]  Lab Frequency Next Occurrence               Patient Active Problem List:     Smoking     Hypertension     Uterine fibroid     Menorrhagia

## 2022-01-25 ENCOUNTER — TELEPHONE (OUTPATIENT)
Dept: FAMILY MEDICINE CLINIC | Age: 62
End: 2022-01-25

## 2022-01-25 NOTE — TELEPHONE ENCOUNTER
Today 1/25/2022, office received [employer] form, called patient to an appointment and to sign a MALISSA, telephone mailbox is full. Patient last seen in office Dec 2020.

## 2022-01-25 NOTE — TELEPHONE ENCOUNTER
2ND ATTEMPT, CALLED PT -no answer/no v-mail. Patient needs appointment before utility form is completed, and she needs to sign a MALISSA.

## 2022-01-27 ENCOUNTER — OFFICE VISIT (OUTPATIENT)
Dept: FAMILY MEDICINE CLINIC | Age: 62
End: 2022-01-27
Payer: MEDICAID

## 2022-01-27 ENCOUNTER — HOSPITAL ENCOUNTER (OUTPATIENT)
Age: 62
Setting detail: SPECIMEN
Discharge: HOME | End: 2022-01-27

## 2022-01-27 VITALS
SYSTOLIC BLOOD PRESSURE: 139 MMHG | WEIGHT: 194.8 LBS | HEIGHT: 68 IN | HEART RATE: 95 BPM | BODY MASS INDEX: 29.52 KG/M2 | DIASTOLIC BLOOD PRESSURE: 80 MMHG | TEMPERATURE: 98.8 F

## 2022-01-27 DIAGNOSIS — Z00.00 HEALTH CARE MAINTENANCE: ICD-10-CM

## 2022-01-27 DIAGNOSIS — I10 PRIMARY HYPERTENSION: ICD-10-CM

## 2022-01-27 DIAGNOSIS — I10 PRIMARY HYPERTENSION: Primary | ICD-10-CM

## 2022-01-27 LAB
ANION GAP SERPL CALCULATED.3IONS-SCNC: 15 MMOL/L (ref 9–17)
BUN BLDV-MCNC: 18 MG/DL (ref 8–23)
BUN/CREAT BLD: ABNORMAL (ref 9–20)
CALCIUM SERPL-MCNC: 9.8 MG/DL (ref 8.6–10.4)
CHLORIDE BLD-SCNC: 109 MMOL/L (ref 98–107)
CHOLESTEROL/HDL RATIO: 2.4
CHOLESTEROL: 270 MG/DL
CO2: 24 MMOL/L (ref 20–31)
CREAT SERPL-MCNC: 1.04 MG/DL (ref 0.5–0.9)
GFR AFRICAN AMERICAN: >60 ML/MIN
GFR NON-AFRICAN AMERICAN: 54 ML/MIN
GFR SERPL CREATININE-BSD FRML MDRD: ABNORMAL ML/MIN/{1.73_M2}
GFR SERPL CREATININE-BSD FRML MDRD: ABNORMAL ML/MIN/{1.73_M2}
GLUCOSE BLD-MCNC: 90 MG/DL (ref 70–99)
HDLC SERPL-MCNC: 112 MG/DL
LDL CHOLESTEROL: 147 MG/DL (ref 0–130)
POTASSIUM SERPL-SCNC: 4.2 MMOL/L (ref 3.7–5.3)
SODIUM BLD-SCNC: 148 MMOL/L (ref 135–144)
TRIGL SERPL-MCNC: 57 MG/DL
VITAMIN D 25-HYDROXY: 42.7 NG/ML (ref 30–100)
VLDLC SERPL CALC-MCNC: ABNORMAL MG/DL (ref 1–30)

## 2022-01-27 PROCEDURE — G8427 DOCREV CUR MEDS BY ELIG CLIN: HCPCS

## 2022-01-27 PROCEDURE — 1036F TOBACCO NON-USER: CPT

## 2022-01-27 PROCEDURE — G8484 FLU IMMUNIZE NO ADMIN: HCPCS

## 2022-01-27 PROCEDURE — 99213 OFFICE O/P EST LOW 20 MIN: CPT

## 2022-01-27 PROCEDURE — 3017F COLORECTAL CA SCREEN DOC REV: CPT

## 2022-01-27 PROCEDURE — G8419 CALC BMI OUT NRM PARAM NOF/U: HCPCS

## 2022-01-27 RX ORDER — FLUTICASONE PROPIONATE 50 MCG
2 SPRAY, SUSPENSION (ML) NASAL DAILY
Qty: 16 G | Refills: 0 | Status: SHIPPED | OUTPATIENT
Start: 2022-01-27

## 2022-01-27 ASSESSMENT — PATIENT HEALTH QUESTIONNAIRE - PHQ9
SUM OF ALL RESPONSES TO PHQ QUESTIONS 1-9: 0
1. LITTLE INTEREST OR PLEASURE IN DOING THINGS: 0
DEPRESSION UNABLE TO ASSESS: PT REFUSES
SUM OF ALL RESPONSES TO PHQ QUESTIONS 1-9: 0
2. FEELING DOWN, DEPRESSED OR HOPELESS: 0
SUM OF ALL RESPONSES TO PHQ QUESTIONS 1-9: 0
SUM OF ALL RESPONSES TO PHQ QUESTIONS 1-9: 0
SUM OF ALL RESPONSES TO PHQ9 QUESTIONS 1 & 2: 0

## 2022-01-27 ASSESSMENT — ENCOUNTER SYMPTOMS
DIARRHEA: 0
BACK PAIN: 0
COUGH: 0
SORE THROAT: 0
CONSTIPATION: 0
SHORTNESS OF BREATH: 0
ABDOMINAL PAIN: 0
RHINORRHEA: 0
NAUSEA: 0
WHEEZING: 0
ABDOMINAL DISTENTION: 0
VOMITING: 0
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
## 2022-01-27 NOTE — PROGRESS NOTES
Visit Information    Have you changed or started any medications since your last visit including any over-the-counter medicines, vitamins, or herbal medicines? no   Have you stopped taking any of your medications? Is so, why? -  no  Are you having any side effects from any of your medications? - no    Have you seen any other physician or provider since your last visit?  no   Have you had any other diagnostic tests since your last visit?  no   Have you been seen in the emergency room and/or had an admission in a hospital since we last saw you?  no   Have you had your routine dental cleaning in the past 6 months?  no     Do you have an active MyChart account? If no, what is the barrier?   Yes    Patient Care Team:  Lexi Dalton MD as PCP - General (Emergency Medicine)  Harjeet Oliva MD as Consulting Physician (Endocrinology)    Medical History Review  Past Medical, Family, and Social History reviewed and does not contribute to the patient presenting condition    Health Maintenance   Topic Date Due    Hepatitis C screen  Never done    COVID-19 Vaccine (1) Never done    Depression Screen  Never done    DTaP/Tdap/Td vaccine (1 - Tdap) Never done    Colon cancer screen colonoscopy  Never done    Shingles Vaccine (1 of 2) Never done    Breast cancer screen  07/07/2017    Potassium monitoring  06/11/2019    Creatinine monitoring  06/11/2019    Lipid screen  07/07/2020    Diabetes screen  06/11/2021    Flu vaccine (1) Never done    HIV screen  Completed    Hepatitis A vaccine  Aged Out    Hepatitis B vaccine  Aged Out    Hib vaccine  Aged Out    Meningococcal (ACWY) vaccine  Aged Out    Pneumococcal 0-64 years Vaccine  Aged Out

## 2022-01-27 NOTE — PATIENT INSTRUCTIONS
Thank you for letting us take care of you today. We hope all your questions were addressed. If a question was overlooked or something else comes to mind after you return home, please contact a member of your Care Team listed below. Your Care Team at [place] [address] is Team #3  Hayden Negro MD (Faculty)  Iggy Boyce MD (Faculty  Westbrookjeanine Low MD (Resident)  Elsa Coronado (Resident)   Marilin Garcia MD (Resident)  Brown Garsia MD (Resident)  Mariana Joseph., RADHA Jimenez., RADHA Watts., Sam Vazquez., Tatiana Gorman ([facility])  Rolanda Moss, [facility] (Clinical Practice Manager)  Malcolm Haile, [facility] (Clinical Pharmacist)     Office phone number: [phone]    If you need to get in right away due to illness, please be advised we have \"Same Day\" appointments available Monday-Friday. Please call us at [phone] option #3 to schedule your \"Same Day\" appointment.

## 2022-01-28 ENCOUNTER — TELEPHONE (OUTPATIENT)
Dept: SURGERY | Age: 62
End: 2022-01-28

## 2022-02-01 ENCOUNTER — TELEPHONE (OUTPATIENT)
Dept: SURGERY | Age: 62
End: 2022-02-01

## 2022-02-03 ENCOUNTER — TELEPHONE (OUTPATIENT)
Dept: SURGERY | Age: 62
End: 2022-02-03

## 2022-04-19 ENCOUNTER — OFFICE VISIT (OUTPATIENT)
Dept: FAMILY MEDICINE CLINIC | Age: 62
End: 2022-04-19
Payer: MEDICAID

## 2022-04-19 VITALS
HEART RATE: 74 BPM | HEIGHT: 68 IN | BODY MASS INDEX: 28.55 KG/M2 | WEIGHT: 188.4 LBS | TEMPERATURE: 96.8 F | DIASTOLIC BLOOD PRESSURE: 89 MMHG | SYSTOLIC BLOOD PRESSURE: 138 MMHG

## 2022-04-19 DIAGNOSIS — S16.1XXA STRAIN OF NECK MUSCLE, INITIAL ENCOUNTER: Primary | ICD-10-CM

## 2022-04-19 PROCEDURE — G8419 CALC BMI OUT NRM PARAM NOF/U: HCPCS | Performed by: STUDENT IN AN ORGANIZED HEALTH CARE EDUCATION/TRAINING PROGRAM

## 2022-04-19 PROCEDURE — 1036F TOBACCO NON-USER: CPT | Performed by: STUDENT IN AN ORGANIZED HEALTH CARE EDUCATION/TRAINING PROGRAM

## 2022-04-19 PROCEDURE — 3017F COLORECTAL CA SCREEN DOC REV: CPT | Performed by: STUDENT IN AN ORGANIZED HEALTH CARE EDUCATION/TRAINING PROGRAM

## 2022-04-19 PROCEDURE — G8427 DOCREV CUR MEDS BY ELIG CLIN: HCPCS | Performed by: STUDENT IN AN ORGANIZED HEALTH CARE EDUCATION/TRAINING PROGRAM

## 2022-04-19 PROCEDURE — 99213 OFFICE O/P EST LOW 20 MIN: CPT | Performed by: STUDENT IN AN ORGANIZED HEALTH CARE EDUCATION/TRAINING PROGRAM

## 2022-04-19 RX ORDER — ATORVASTATIN CALCIUM 20 MG/1
20 TABLET, FILM COATED ORAL NIGHTLY
Qty: 90 TABLET | Refills: 1 | Status: SHIPPED | OUTPATIENT
Start: 2022-04-19 | End: 2022-09-22

## 2022-04-19 RX ORDER — CYCLOBENZAPRINE HCL 10 MG
10 TABLET ORAL NIGHTLY PRN
Qty: 15 TABLET | Refills: 0 | Status: SHIPPED | OUTPATIENT
Start: 2022-04-19 | End: 2022-04-29

## 2022-04-19 ASSESSMENT — ENCOUNTER SYMPTOMS
ABDOMINAL PAIN: 0
WHEEZING: 0
VOMITING: 0
DIARRHEA: 0
RHINORRHEA: 0
SORE THROAT: 0
SHORTNESS OF BREATH: 0
COUGH: 0
EYES NEGATIVE: 1
NAUSEA: 0

## 2022-04-19 NOTE — PROGRESS NOTES
Visit Information    Have you changed or started any medications since your last visit including any over-the-counter medicines, vitamins, or herbal medicines? no   Have you stopped taking any of your medications? Is so, why? -  no  Are you having any side effects from any of your medications? - no    Have you seen any other physician or provider since your last visit?  no   Have you had any other diagnostic tests since your last visit? yes - labs   Have you been seen in the emergency room and/or had an admission in a hospital since we last saw you?  no   Have you had your routine dental cleaning in the past 6 months?  no     Do you have an active MyChart account? If no, what is the barrier?   Yes    Patient Care Team:  Clint Cook MD as PCP - General (Emergency Medicine)  Pooja Light MD as Consulting Physician (Endocrinology)    Medical History Review  Past Medical, Family, and Social History reviewed and does not contribute to the patient presenting condition    Health Maintenance   Topic Date Due    Hepatitis C screen  Never done    COVID-19 Vaccine (1) Never done    Colorectal Cancer Screen  Never done    Shingles Vaccine (1 of 2) Never done    Breast cancer screen  07/07/2017    DTaP/Tdap/Td vaccine (1 - Tdap) 01/27/2023 (Originally 12/30/1979)    Flu vaccine (Season Ended) 01/27/2023 (Originally 9/1/2022)    Depression Screen  01/27/2023    Potassium monitoring  01/27/2023    Creatinine monitoring  01/27/2023    Lipid screen  01/27/2027    HIV screen  Completed    Hepatitis A vaccine  Aged Out    Hepatitis B vaccine  Aged Out    Hib vaccine  Aged Out    Meningococcal (ACWY) vaccine  Aged Out    Pneumococcal 0-64 years Vaccine  Aged Out
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
## 2022-04-19 NOTE — PATIENT INSTRUCTIONS
Thank you for letting us take care of you today. We hope all your questions were addressed. If a question was overlooked or something else comes to mind after you return home, please contact a member of your Care Team listed below. Your Care Team at [place] [address] is Team #2  Amparo Nicholson DO (Faculty)  Alie Perez (Faculty)  Seward Buerger, MD (Resident)  Karina Silvestre MD (Resident)  Miah Birmingham MD (Resident)  David Drummond MD (Resident)  Jocelyn Gaxiola., CLAUDETTE Sutherland.,  ALBERTO Redding., [facility])  Mary John, [address] (Clinical Practice Manager)  Arturo Bailey [facility] (Clinical Pharmacist)     Office phone number: [phone]    If you need to get in right away due to illness, please be advised we have \"Same Day\" appointments available Monday-Friday. Please call us at [phone] option #3 to schedule your \"Same Day\" appointment. Patient Education        Learning About the Mediterranean Diet  What is the [address]? The Mediterranean diet is a style of eating rather than a diet plan. It features foods eaten in [place], [place], [place], and other countries along the [facility]. It emphasizes eating foods like fish, fruits, vegetables, beans, high-fiber breads and whole grains, nuts, and oliveoil. This style of eating includes limited red meat, cheese, and sweets. Why choose the Mediterranean diet? A Mediterranean-style diet may improve heart health. It contains more fat than other heart-healthy diets. But the fats are mainly from nuts, unsaturated oils (such as fish oils and olive oil), and certain nut or seed oils (such as canola, soybean, or flaxseed oil). These fats may help protect the heart andblood vessels. How can you get started on the Mediterranean diet? Here are some things you can do to switch to a more Mediterranean way of eating.   What to eat   Eat a variety of fruits and vegetables each day, such as grapes, blueberries, tomatoes, broccoli, peppers, figs, olives, spinach, eggplant, beans, lentils, and chickpeas.  Eat a variety of whole-grain foods each day, such as oats, brown rice, and whole wheat bread, pasta, and couscous.  Eat fish at least 2 times a week. Try tuna, salmon, mackerel, lake trout, herring, or sardines.  Eat moderate amounts of low-fat dairy products, such as milk, cheese, or yogurt.  Eat moderate amounts of poultry and eggs.  Choose healthy (unsaturated) fats, such as nuts, olive oil, and certain nut or seed oils like canola, soybean, and flaxseed.  Limit unhealthy (saturated) fats, such as butter, palm oil, and coconut oil. And limit fats found in animal products, such as meat and dairy products made with whole milk. Try to eat red meat only a few times a month in very small amounts.  Limit sweets and desserts to only a few times a week. This includes sugar-sweetened drinks like soda. The Mediterranean diet may also include red wine with your meal--1 glass eachday for women and up to 2 glasses a day for men. Tips for eating at home   Use herbs, spices, garlic, lemon zest, and citrus juice instead of salt to add flavor to foods.  Add avocado slices to your sandwich instead of de la paz.  Have fish for lunch or dinner instead of red meat. Brush the fish with olive oil, and broil or grill it.  Sprinkle your salad with seeds or nuts instead of cheese. Leeta Meager with olive or canola oil instead of butter or oils that are high in saturated fat.  Switch from 2% milk or whole milk to 1% or fat-free milk.  Dip raw vegetables in a vinaigrette dressing or hummus instead of dips made from mayonnaise or sour cream.   Have a piece of fruit for dessert instead of a piece of cake. Try baked apples, or have some dried fruit. Tips for eating out   Try broiled, grilled, baked, or poached fish instead of having it fried or breaded.    Ask your  to have your meals prepared with olive oil instead of butter.  Order dishes made with marinara sauce or sauces made from olive oil. Avoid sauces made from cream or mayonnaise.  Choose whole-grain breads, whole wheat pasta and pizza crust, brown rice, beans, and lentils.  Cut back on butter or margarine on bread. Instead, you can dip your bread in a small amount of olive oil.  Ask for a side salad or grilled vegetables instead of french fries or chips. Where can you learn more? Go to https://JayCutpeAsanti.SchoolChapters. org and sign in to your [employer] account. Enter [phone] in the [facility] box to learn more about \"Learning About the Mediterranean Diet. \"     If you do not have an account, please click on the \"Sign Up Now\" link. Current as of: September 8, 2021               Content Version: 13.2  © 2006-2022 [employer]. Care instructions adapted under license by [facility] ([facility]). If you have questions about a medical condition or this instruction, always ask your healthcare professional. [address] any warranty or liability for your use of this information. Patient Education        atorvastatin  Pronunciation: a TOR va sta tin  Brand: Lipitor  What is the most important information I should know about atorvastatin? You should not take atorvastatin if you are pregnant or breastfeeding, or ifyou have liver disease. Tell your doctor about all your current medicines and any you start or stop using. Many drugs can interact, and some drugs should not be used together. Atorvastatin can cause the breakdown of muscle tissue, which can lead to kidney failure. Call your doctor right away if you have unexplained muscle pain, tenderness, or weakness especially if you also have fever, unusual tiredness,or dark urine. What is atorvastatin?   Atorvastatin is used together with diet to lower blood levels of \"bad\" cholesterol (low-density lipoprotein, or LDL), to increase levels of \"good\" cholesterol (high-density lipoprotein, or HDL), and to lower triglycerides (atype of fat in the blood). Atorvastatin is used to treat high cholesterol, and to lower the risk of stroke, heart attack, or other heart complications in people with type 2diabetes, coronary heart disease, or other risk factors. Atorvastatin is used in adults and children who are at least 8years old. Atorvastatin may also be used for purposes not listed in this medication guide. What should I discuss with my healthcare provider before taking atorvastatin? You should not use atorvastatin if you are allergic to it, or if you have liverdisease. Do not use if you are pregnant. This medicine can harm an unborn baby. Use effective birth control to prevent pregnancy. Stop taking this medicine and tell your doctor at once if you becomepregnant. Do not breastfeed while you are taking atorvastatin. Tell your doctor if you have ever had:   liver problems;   muscle pain or weakness;   kidney disease;   diabetes;   a thyroid disorder; or   if you drink more than 2 alcoholic beverages daily. Atorvastatin can cause the breakdown of muscle tissue, which can lead to kidney failure. This happens more often in women, in older adults, or people who have kidneydisease or poorly controlled hypothyroidism (underactive thyroid). Atorvastatin is not approved for use by anyone younger than 8years old. How should I take atorvastatin? Follow all directions on your prescription label and read all medication guides or instruction sheets. Your doctor may occasionally change your dose. Use themedicine exactly as directed. Take the medicine at the same time each day, with or without food. Do not break an atorvastatin tablet before taking it, unless your doctor has told you to.   You may need to stop using atorvastatin for a short time if you have:   uncontrolled seizures;   an electrolyte imbalance (such as high or low potassium levels in your blood);   severely low blood pressure;   a severe infection or illness; or   surgery or a medical emergency. It may take up to 2 weeks before your cholesterol levels improve, and you may need frequent blood tests. Even if you have no symptoms, tests can help yourdoctor determine if this medicine is effective. Atorvastatin is only part of a complete treatment program that may also include diet, exercise, and weight control. Follow your doctor's instructions veryclosely. Store at room temperature away from moisture, heat, and light. What happens if I miss a dose? Use the medicine as soon as you can, but skip the missed dose if you are more than 12 hours late for the dose. Do not use two doses at one time. What happens if I overdose? Seek emergency medical attention or call the Poison Help line at 1-284.831.5511. What should I avoid while taking atorvastatin? Avoid eating foods high in fat or cholesterol, or atorvastatin will not be aseffective. Avoid drinking alcohol. It can raise triglyceride levels and may increase yourrisk of liver damage. Grapefruit may interact with atorvastatin and lead to unwanted side effects. Avoid drinking more than 1 liter of grapefruit juice while taking atorvastatin. What are the possible side effects of atorvastatin? Get emergency medical help if you have signs of an allergic reaction: hives; difficulty breathing; swelling of your face, lips, tongue, or throat. In rare cases, atorvastatin can cause a condition that results in the breakdown of skeletal muscle tissue, leading to kidney failure. Call your doctor right away if you have unexplained muscle pain, tenderness, or weakness especially ifyou also have fever, unusual tiredness, and dark colored urine.   Also call your doctor at once if you have:   muscle weakness in your hips, shoulders, neck, and back;   trouble lifting your arms, trouble climbing or standing;   liver problems --upper stomach pain, weakness, tired feeling, loss of appetite, dark urine, jaundice (yellowing of the skin or eyes); or   kidney problems --little or no urinating, swelling in your feet or ankles, feeling tired or short of breath. Common side effects may include:   joint pain;   stuffy nose, sore throat;   diarrhea; or   pain in your arms or legs. This is not a complete list of side effects and others may occur. Call your doctor for medical advice about side effects. You may report side effects toFDA at 2-177-WIJ-8441. What other drugs will affect atorvastatin? Certain other drugs can increase your risk of serious muscle problems, and it is very important that your doctor knows if you are using any of them. Tell your doctor about all your current medicines and any you start or stopusing, especially:   other cholesterol-lowering medication;   antibiotic or antifungal medicine;   birth control pills;   medicine to prevent organ transplant rejection;   heart medication; or   medicine to treat hepatitis C or HIV. This list is not complete and many other drugs may affect atorvastatin. This includes prescription and over-the-counter medicines, vitamins, andherbal products. Not all possible drug interactions are listed here. Where can I get more information? Your pharmacist can provide more information about atorvastatin. Remember, keep this and all other medicines out of the reach of children, never share your medicines with others, and use this medication only for the indication prescribed. Every effort has been made to ensure that the information provided by [address] is accurate, up-to-date, and complete, but no guarantee is made to that effect. Drug information contained herein may be time sensitive. [employer] information has been compiled for use by healthcare practitioners and consumers in the [place] and therefore [facility] does not warrant that uses outside of the [place] are appropriate, unless specifically indicated otherwise. [facility] drug information does not endorse drugs, diagnose patients or recommend therapy. [facility]'s drug information is an informational resource designed to assist licensed healthcare practitioners in caring for their patients and/or to serve consumers viewing this service as a supplement to, and not a substitute for, the expertise, skill, knowledge and judgment of healthcare practitioners. The absence of a warning for a given drug or drug combination in no way should be construed to indicate that the drug or drug combination is safe, effective or appropriate for any given patient. [facility] does not assume any responsibility for any aspect of healthcare administered with the aid of information [facility] provides. The information contained herein is not intended to cover all possible uses, directions, precautions, warnings, drug interactions, allergic reactions, or adverse effects. If you have questions about the drugs you are taking, check with yourdoctor, nurse or pharmacist.  Copyright 0152-2576 [address]: 22.02. Revision date: 2/9/2021. Care instructions adapted under license by [facility] ([facility]). If you have questions about a medical condition or this instruction, always ask your healthcare professional. [place] [address] any warranty or liability for your use of this information. Patient Education        Neck: Exercises  Introduction  Here are some examples of exercises for you to try. The exercises may be suggested for a condition or for rehabilitation. Start each exercise slowly. Ease off the exercises if you start to have pain. You will be told when to start these exercises and which ones will work bestfor you. How to do the exercises  Neck stretch    1. This stretch works best if you keep your shoulder down as you lean away from it. To help you remember to do this, start by relaxing your shoulders and lightly holding on to your thighs or your chair.   2. Tilt your head toward your shoulder and hold for 15 to 30 seconds. Let the weight of your head stretch your muscles. 3. If you would like a little added stretch, use your hand to gently and steadily pull your head toward your shoulder. For example, keeping your right shoulder down, lean your head to the left. 4. Repeat 2 to 4 times toward each shoulder. Diagonal neck stretch    1. Turn your head slightly toward the direction you will be stretching, and tilt your head diagonally toward your chest and hold for 15 to 30 seconds. 2. If you would like a little added stretch, use your hand to gently and steadily pull your head forward on the diagonal.  3. Repeat 2 to 4 times toward each side. Dorsal glide stretch    The dorsal glide stretches the back of the neck. If you feel pain, do not glide so far back. Some people find this exercise easier to do while lying on theirbacks with an ice pack on the neck. 1. Sit or stand tall and look straight ahead. 2. Slowly tuck your chin as you glide your head backward over your body  3. Hold for a count of 6, and then relax for up to 10 seconds. 4. Repeat 8 to 12 times. Chest and shoulder stretch    1. Sit or stand tall and glide your head backward as in the dorsal glide stretch. 2. Raise both arms so that your hands are next to your ears. 3. Take a deep breath, and as you breathe out, lower your elbows down and behind your back. You will feel your shoulder blades slide down and together, and at the same time you will feel a stretch across your chest and the front of your shoulders. 4. Hold for about 6 seconds, and then relax for up to 10 seconds. 5. Repeat 8 to 12 times. Strengthening: Hands on head    1. Move your head backward, forward, and side to side against gentle pressure from your hands, holding each position for about 6 seconds. 2. Repeat 8 to 12 times. Follow-up care is a key part of your treatment and safety.  Be sure to make and go to all appointments, and call your doctor if you are having problems. It's also a good idea to know your test results and keep alist of the medicines you take. Where can you learn more? Go to https://[email or web address].[email or web address]. org and sign in to your [employer] account. Enter P975 in the [employer] box to learn more about \"Neck: Exercises. \"     If you do not have an account, please click on the \"Sign Up Now\" link. Current as of: July 1, 2021               Content Version: 13.2  © 6597-6958 Healthwise, [employer]orporated. Care instructions adapted under license by [facility] ([facility]). If you have questions about a medical condition or this instruction, always ask your healthcare professional. [address] any warranty or liability for your use of this information.

## 2022-04-22 ENCOUNTER — HOSPITAL ENCOUNTER (EMERGENCY)
Facility: CLINIC | Age: 62
Discharge: HOME | End: 2022-04-22
Attending: EMERGENCY MEDICINE
Payer: MEDICAID

## 2022-04-22 VITALS
BODY MASS INDEX: 28.49 KG/M2 | RESPIRATION RATE: 18 BRPM | SYSTOLIC BLOOD PRESSURE: 147 MMHG | HEART RATE: 88 BPM | DIASTOLIC BLOOD PRESSURE: 86 MMHG | TEMPERATURE: 98.6 F | WEIGHT: 188 LBS | OXYGEN SATURATION: 98 % | HEIGHT: 68 IN

## 2022-04-22 DIAGNOSIS — J01.00 ACUTE NON-RECURRENT MAXILLARY SINUSITIS: Primary | ICD-10-CM

## 2022-04-22 PROCEDURE — 99282 EMERGENCY DEPT VISIT SF MDM: CPT

## 2022-04-22 PROCEDURE — 69210 REMOVE IMPACTED EAR WAX UNI: CPT

## 2022-04-22 ASSESSMENT — ENCOUNTER SYMPTOMS
SINUS PAIN: 1
RESPIRATORY NEGATIVE: 1
EYES NEGATIVE: 1
SINUS PRESSURE: 1
SORE THROAT: 0
GASTROINTESTINAL NEGATIVE: 1

## 2022-04-22 ASSESSMENT — PAIN SCALES - GENERAL: PAINLEVEL_OUTOF10: 7

## 2022-04-22 ASSESSMENT — PAIN - FUNCTIONAL ASSESSMENT: PAIN_FUNCTIONAL_ASSESSMENT: 0-10
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
## 2022-04-22 NOTE — ED PROVIDER NOTES
[facility]    [address] [facility]  Phone: [phone]  Emergency Department  Faculty Attestation    I performed a history and physical examination of the patient and discussed management with the mid level provideer. I reviewed the mid level provider's note and agree with the documented findings and plan of care. Any areas of disagreement are noted on the chart. I was personally present for the key portions of any procedures. I have documented in the chart those procedures where I was not present during the key portions. I have reviewed the emergency nurses triage note. I agree with the chief complaint, past medical history, past surgical history, allergies, medications, social and family history as documented unless otherwise noted below. Documentation of the HPI, Physical Exam and Medical Decision Making performed by medical students or scribes is based on my personal performance of the HPI, PE and MDM. For Physician Assistant/ Nurse Practitioner cases/documentation I have personally evaluated this patient and have completed at least one if not all key elements of the E/M (history, physical exam, and MDM). Additional findings are as noted. Primary Care Physician:  Joshua Garzon MD    CHIEF COMPLAINT       Chief Complaint   Patient presents with    Nasal Congestion    Sinusitis    Otalgia       RECENT VITALS:   Temp: 98.6 °F (37 °C),  Pulse: 88, Resp: 18, BP: (!) 147/86    LABS:  Labs Reviewed - No data to display      PERTINENT ATTENDING PHYSICIAN COMMENTS:    The patient presents with sinus congestion and frontal pain behind her maxillary sinuses and behind her eyes. This has been going on for a few days. She says she has been on Zyrtec for couple days. She does use Flonase. The patient denies fever. On exam, the patient has no pain over the frontal sinuses. She has mild pain over the maxillary area but no proptosis and no chemosis.   Her neck is supple and she phonating normally. We will have her continue inhaled steroids as well as oral antihistamines. I recommend saline nasal washing as well.   The patient is discharged in good condition         Cheryl Phelps MD  04/22/22 9978

## 2022-04-26 ENCOUNTER — HOSPITAL ENCOUNTER (OUTPATIENT)
Dept: PHYSICAL THERAPY | Age: 62
Setting detail: THERAPIES SERIES
Discharge: HOME | End: 2022-04-26
Payer: MEDICAID

## 2022-04-26 PROCEDURE — 97162 PT EVAL MOD COMPLEX 30 MIN: CPT
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
## 2022-05-05 NOTE — PRE-CERTIFICATION NOTE
[x] [facility]) [facility] &  Therapy  [address]  [phone]  F: [phone] [] [address]  [facility]   [address]  P: [phone]  F: [phone] [] [place]  [address]  P: [phone]  F: [phone] [] [address]  [facility]   [address]   [place]: [phone]  F: [phone]  [x] [facility]   Outpatient Occupational Therapy  [address]: [phone]  F: [phone]          Therapy Pre-certification Note      5/5/2022    Fernandez Pino  1960   [id number]      Insurance approval was received for Physical Therapy from [facility] on 5/4/22. Approval was received for 12 visits, from 5/2/22 to 7/30/22. Authorization number [id number]. Approved  90194 48 units  75813 30 units  16649 80 uints  98579 40 units  92105 89 units  42128 57 units      Cancelled  58813 12 visits  [phone] visits    Patient was contacted to be scheduled and patient did not answer and VM was full. Unable to leave a message secondary to this.       Electronically signed by Kendrick Pavon PTA on 5/5/2022 at 7:34 AM

## 2022-05-09 ENCOUNTER — HOSPITAL ENCOUNTER (OUTPATIENT)
Dept: PHYSICAL THERAPY | Age: 62
Setting detail: THERAPIES SERIES
Discharge: HOME | End: 2022-05-09
Payer: MEDICAID

## 2022-05-09 PROCEDURE — 97140 MANUAL THERAPY 1/> REGIONS: CPT

## 2022-05-09 PROCEDURE — 97110 THERAPEUTIC EXERCISES: CPT

## 2022-05-09 NOTE — FLOWSHEET NOTE
[x] [facility]) [facility] &  Therapy  [address]  [phone]  F: [phone] [] [address]  [address]   [address]  P: [phone]  F: [phone] [] [address]  [address]  P: [phone]  F: [phone] [] [address]  P: [phone]  F: [phone] [] [address]  [facility]   [address]   [place]: [phone]  F: [phone]      Physical Therapy Daily Treatment Note    Date:  2022  Patient Name:  Darby Dowling    :  1960  MRN: [id number]  Physician: Clarissa Cabrera MD; [address]: [facility] after eval)  Medical Diagnosis: Strain of neck muscle, S16. 1XXA                      Rehab Codes: M54.2, M54.6, M25.512, M25.511, R29.3, M62.511, M62.512   Onset Date: 2022                    Next 's appt. : 3 months     Visit# / total visits: 2/12    Cancels/No Shows: 0/0    Subjective:    Pain:  [x] Yes  [] No Location: neck  Pain Rating: (0-10 scale) 5/10  Pain altered Tx:  [x] No  [] Yes  Action:  Comments: Pt arrives reporting a \"nagging\" pain in cervical spine. Reports some tingling over the weekend across shoulder blades and through L UE that come on suddenly but resolved quickly.     Objective:  Modalities:   Precautions:  Exercises:  Exercise Reps/ Time Weight/ Level Comments   UBE 2'/2' L1 Fwd, Bwd         Seated      Manual 10mins  MFR to L UT, L   UT stretch 3x15\"     Scapular retraction 15x5\"     Retro shoulder rolls 15x     Cervical ROM 5x ea  Rotation, sidebending   Chin tucks 10x5\"     Active shoulder ROM 10x ea  Flexion, abd         Supine      Manual cervical traction x     Wand flexion 10x 1 lb Pain in L UE   Wand abd 10x 1 lb    Wand chest press 10x 1 lb          Other:      Treatment Charges: Mins Units   []  Modalities     [x]  Ther Exercise 29 2   [x]  Manual Therapy 12 1   []  Ther Activities     []  Aquatics     []  Vasocompression     []  Other     Total Treatment time 41 3       Assessment: [x] Progressing toward goals. Initiated treatment with UBE with fair tolerance just mild discomfort in LUE noted towards the end of warm up. Initiated MFR to L UT and rhomboids this date with tenderness noted throughout but mild release of muscle tightness noted after manual. Educated pt on postural awareness and the importance of cervical stretches to progress ROM with good tolerance and no report of pain throughout. Added AA wand and active shoulder ROM providing cues to stay within pain free range with good carryover. Pt continues to report a decrease in symptoms with manual cervical traction. Will provide pt with written HEP next session due to time constraints this date. Pt reports an overall decrease in pain at end of treatment. [] No change. [] Other:  [x] Patient would continue to benefit from skilled physical therapy services in order to: decrease pain neck, shoulders, upper back, increase cerv ROM, increase strength B UE , and improve tolerance to sitting, standing, and driving to improve tolerance to working. STG: (to be met in 12 treatments)  1. ? Pain: Neck, upper back, L shoulder 3/10 average pain, 5/10 at worst  2. ? ROM:Cerv flex 40°, SB B 20°, B Rot WNL w/out increased pain  3. ? Strength: B Shoulder 4-/5, B elbows 4/5, finger abd L 4-/5, L  30 lbs  4. ? Function: 18% loss of neck function   5. Pt report improved tolerance to sitting, standing, and driving to improve tolerance to working   6. Patient to be independent with home exercise program as demonstrated by performance with correct form without cues.          Patient goals: \"no back strain or burning after sitting or standing\"    Pt.  Education:  [x] Yes  [] No [] Reviewed Prior HEP/Ed  Method of Education: [x] Verbal  [x] Demo  [] Written   Comprehension of Education:  [] Verbalizes understanding. [] Demonstrates understanding. [] Needs review. [] Demonstrates/verbalizes HEP/Ed previously given. Plan: [x] Continue current frequency toward long and short term goals.     [x] Specific Instructions for subsequent treatments: begin postural ed, gentle neck, shoulder ex, manual cerv traction, ok for HP ES to decrease pain, tightness, print written HEP next session      Time In: 1:30pm           Time Out: 2:16pm    Electronically signed by:  Elham Beaver PTA

## 2022-05-16 ENCOUNTER — HOSPITAL ENCOUNTER (OUTPATIENT)
Dept: PHYSICAL THERAPY | Age: 62
Setting detail: THERAPIES SERIES
Discharge: HOME | End: 2022-05-16
Payer: MEDICAID

## 2022-05-16 PROCEDURE — 97110 THERAPEUTIC EXERCISES: CPT

## 2022-05-16 PROCEDURE — 97140 MANUAL THERAPY 1/> REGIONS: CPT

## 2022-05-16 NOTE — FLOWSHEET NOTE
[x] [facility]) [facility] &  Therapy  [address]  [phone]  F: [phone] [] [address]  [place] [address]  P: [phone]  F: [phone] [] [address]  [address]  P: [phone]  F: [phone] [] [address]  P: [phone]  F: [phone] [] [address]  [facility]   [address]   [place]: [phone]  F: [phone]      Physical Therapy Daily Treatment Note    Date:  2022  Patient Name:  Fernandez Johnson    :  1960  MRN: [id number]  Physician: Jalil Rios MD; [address]: [facility] after eval)  Medical Diagnosis: Strain of neck muscle, S16. 1XXA                      Rehab Codes: M54.2, M54.6, M25.512, M25.511, R29.3, M62.511, M62.512   Onset Date: 2022                    Next Dr.'s appt. : 3 months   Visit# / total visits: 3/12    Cancels/No Shows: 0/0    Subjective:    Pain:  [x] Yes  [] No Location: L sided cervical spine  Pain Rating: (0-10 scale) 5/10  Pain altered Tx:  [x] No  [] Yes  Action:  Comments: Pt continues to report a \"nagging\" pain in cervical spine and UT this date. States she drove about 45 mins today from [place], [place] and is having some bilateral UT pain/tightness from prolonged driving.       Objective:  Modalities:   Precautions:  Exercises:  Exercise Reps/ Time Weight/ Level Comments   UBE 2'/2' L1 Fwd, Bwd         Seated      Manual 15 mins  MFR and hypervolt to bilateral UT, rhomboids     UT stretch 5x15\"     Scapular retraction 15x5\"     Retro shoulder rolls 20x     Cervical ROM 5x ea  Rotation, sidebending   Chin tucks 10x5\"     Active shoulder ROM 10x ea  Flexion, abd         Supine      Manual cervical traction x  manual traction followed by passive UT stretch   Wand flexion 15x 1 lb Pain in L UE   Wand abd 15x ea 1 lb    Wand chest press 15x 1 lb          Other:      Treatment Charges: Mins Units   []  Modalities     [x]  Ther Exercise 25 2   [x]  Manual Therapy 18 1   []  Ther Activities     []  Aquatics     []  Vasocompression     []  Other     Total Treatment time 43 3       Assessment: [x] Progressing toward goals. Initiated treatment with UBE with good tolerance. Continued with MFR to bilateral UT and rhomboids this date with better tolerance compared to last session therefore trialed hypervolt with good relief of tenderness noted after manual. Also added passive stretching of bilateral UT after manual traction with improved cervical ROM noted. Progressed reps for wand AAROM shoulder exercises with good tolerance and only min verbal cues required for technique with good carryover. Pt reports an overall decrease in symptoms at end of treatment. [] No change. [x] Other: Issued and reviewed written HEP handout and encouraged daily compliance. [x] Patient would continue to benefit from skilled physical therapy services in order to: decrease pain neck, shoulders, upper back, increase cerv ROM, increase strength B UE , and improve tolerance to sitting, standing, and driving to improve tolerance to working. STG: (to be met in 12 treatments)  1. ? Pain: Neck, upper back, L shoulder 3/10 average pain, 5/10 at worst  2. ? ROM:Cerv flex 40°, SB B 20°, B Rot WNL w/out increased pain  3. ? Strength: B Shoulder 4-/5, B elbows 4/5, finger abd L 4-/5, L  30 lbs  4. ? Function: 18% loss of neck function   5. Pt report improved tolerance to sitting, standing, and driving to improve tolerance to working   6.  Patient to be independent with home exercise program as demonstrated by performance with correct form without cues.      Patient goals: \"no back strain or burning after sitting or standing\"    Pt. Education:  [x] Yes  [] No  [x] Reviewed Prior HEP/Ed  Method of Education: [x] Verbal  [x] Demo  [x] Written   Comprehension of Education:  [] Verbalizes understanding. [] Demonstrates understanding. [x] Needs review. [] Demonstrates/verbalizes HEP/Ed previously given. Access Code: [email or web address]  URL: [email or web address]  Date: 05/16/2022  Prepared by: Uma Andrade    Exercises  Seated Upper Trapezius Stretch - 1 x daily - 7 x weekly - 1 sets - 3 reps - 15 hold  Seated Cervical Rotation AROM - 1 x daily - 7 x weekly - 3 sets - 5 reps - 5 hold  Seated Scapular Retraction - 1 x daily - 7 x weekly - 2 sets - 10 reps - 5 hold  Seated Cervical Retraction - 1 x daily - 7 x weekly - 2 sets - 10 reps - 5 hold  Supine Shoulder Flexion Extension AAROM with Dowel - 1 x daily - 7 x weekly - 2 sets - 10 reps - 5 hold  Supine Shoulder Abduction AAROM with Dowel - 1 x daily - 7 x weekly - 2 sets - 10 reps - 5 hold      Plan: [x] Continue current frequency toward long and short term goals.     [x] Specific Instructions for subsequent treatments: begin postural ed, gentle neck, shoulder ex, manual cerv traction, ok for HP ES to decrease pain, tightness      Time In: 1:30pm           Time Out: 2:18pm    Electronically signed by:  Uma Andrade PTA

## 2022-05-19 ENCOUNTER — HOSPITAL ENCOUNTER (OUTPATIENT)
Dept: PHYSICAL THERAPY | Age: 62
Setting detail: THERAPIES SERIES
Discharge: HOME | End: 2022-05-19
Payer: MEDICAID

## 2022-05-19 PROCEDURE — 97140 MANUAL THERAPY 1/> REGIONS: CPT

## 2022-05-19 PROCEDURE — 97110 THERAPEUTIC EXERCISES: CPT

## 2022-05-19 NOTE — FLOWSHEET NOTE
[x] [facility]) [facility] &  Therapy  [address]  [phone]  F: [phone] [] [address]  [place] [address]   [address]  P: [phone]  F: [phone] [] [address]  [address]  P: [phone]  F: [phone] [] [address]  P: [phone]  F: [phone] [] [address]  [facility]   [address]   [place]: [phone]  F: [phone]      Physical Therapy Daily Treatment Note    Date:  2022  Patient Name:  Montserrat Guerrero    :  1960  MRN: [id number]  Physician: Johney Dubin, MD; [address]: [facility] after eval)  Medical Diagnosis: Strain of neck muscle, S16. 1XXA                      Rehab Codes: M54.2, M54.6, M25.512, M25.511, R29.3, M62.511, M62.512   Onset Date: 2022                    Next 's appt. : 3 months   Visit# / total visits: 4/12    Cancels/No Shows: 0/0    Subjective:    Pain:  [x] Yes  [] No Location: L sided cervical spine  Pain Rating: (0-10 scale) 4/10  Pain altered Tx:  [x] No  [] Yes  Action:  Comments: Pt reports feeling good relief of symptoms after manual with hypervolt after last visit. Pt continues to report a \"nagging\" pain in cervical spine however states she has noticed an overall reduction in pain since starting therapy. Pt reports compliance with most HEP exercises- states she did not have time to complete supine wand exercises.      Objective:  Modalities:   Precautions:  Exercises:  Exercise Reps/ Time Weight/ Level Comments   UBE 4' L1 Fwd         Seated      Manual 15 mins  MFR and hypervolt to bilateral UT, rhomboids     UT stretch 5x15\"  HEP   Scapular retraction 20x5\"     Retro shoulder rolls 20x     Cervical ROM 5x ea  Rotation, sidebending   Chin tucks 10x5\"     Active shoulder ROM 10x ea  Flexion, abd  Performed standing 5/19         Supine      Manual cervical traction   manual traction followed by passive UT stretch   Wand flexion 15x 1 lb Pain in L UE   Wand abd 15x ea 1 lb    Wand chest press 15x 1 lb          Standing       Theraband rows 15x lime    Theraband shoulder ext 15x lime          Other:      Treatment Charges: Mins Units   []  Modalities     [x]  Ther Exercise 27 2   [x]  Manual Therapy 15 1   []  Ther Activities     []  Aquatics     []  Vasocompression     []  Other     Total Treatment time 42 3       Assessment: [x] Progressing toward goals. Initiated treatment with UBE fwd only this date due discomfort with retro movement. Continued with MFR and hypervolt focused on L medial border of scapula with good relief noted. Added theraband rows and shoulder extension to progress shoulder and scapular strength with good tolerance just mild fatigue noted and verbal cues required for exercise technique with fair carryover. Encouraged continued compliance with HEP to further reduce UT tightness. [] No change. [] Other:   [x] Patient would continue to benefit from skilled physical therapy services in order to: decrease pain neck, shoulders, upper back, increase cerv ROM, increase strength B UE , and improve tolerance to sitting, standing, and driving to improve tolerance to working. STG: (to be met in 12 treatments)  1. ? Pain: Neck, upper back, L shoulder 3/10 average pain, 5/10 at worst  2. ? ROM:Cerv flex 40°, SB B 20°, B Rot WNL w/out increased pain  3. ? Strength: B Shoulder 4-/5, B elbows 4/5, finger abd L 4-/5, L  30 lbs  4. ? Function: 18% loss of neck function   5. Pt report improved tolerance to sitting, standing, and driving to improve tolerance to working   6.  Patient to be independent with home exercise program as demonstrated by performance with correct form without cues.      Patient goals: \"no back strain or burning after sitting or standing\"    Pt. Education:  [x] Yes  [] No  [x] Reviewed Prior HEP/Ed  Method of Education: [x] Verbal  [x] Demo  [] Written   Comprehension of Education:  [x] Verbalizes understanding. [x] Demonstrates understanding. [] Needs review. [x] Demonstrates/verbalizes HEP/Ed previously given. Access Code: [email or web address]  URL: [email or web address]  Date: 05/16/2022  Prepared by: Uma Andrdae    Exercises  Seated Upper Trapezius Stretch - 1 x daily - 7 x weekly - 1 sets - 3 reps - 15 hold  Seated Cervical Rotation AROM - 1 x daily - 7 x weekly - 3 sets - 5 reps - 5 hold  Seated Scapular Retraction - 1 x daily - 7 x weekly - 2 sets - 10 reps - 5 hold  Seated Cervical Retraction - 1 x daily - 7 x weekly - 2 sets - 10 reps - 5 hold  Supine Shoulder Flexion Extension AAROM with Dowel - 1 x daily - 7 x weekly - 2 sets - 10 reps - 5 hold  Supine Shoulder Abduction AAROM with Dowel - 1 x daily - 7 x weekly - 2 sets - 10 reps - 5 hold      Plan: [x] Continue current frequency toward long and short term goals.     [x] Specific Instructions for subsequent treatments: begin postural ed, gentle neck, shoulder ex, manual cerv traction, ok for HP ES to decrease pain, tightness      Time In: 9:30am           Time Out: 10:16am    Electronically signed by:  Uma Andrade PTA

## 2022-05-20 ENCOUNTER — APPOINTMENT (OUTPATIENT)
Dept: PHYSICAL THERAPY | Age: 62
End: 2022-05-20
Payer: MEDICAID

## 2022-05-23 ENCOUNTER — HOSPITAL ENCOUNTER (OUTPATIENT)
Dept: PHYSICAL THERAPY | Age: 62
Setting detail: THERAPIES SERIES
Discharge: HOME | End: 2022-05-23
Payer: MEDICAID

## 2022-05-23 PROCEDURE — 97140 MANUAL THERAPY 1/> REGIONS: CPT

## 2022-05-23 PROCEDURE — 97110 THERAPEUTIC EXERCISES: CPT

## 2022-05-23 NOTE — FLOWSHEET NOTE
[x] [facility]) [facility] &  Therapy  [address]  [phone]  F: [phone] [] [address]  [place] [address]   [address]  P: [phone]  F: [phone] [] [address]  [address]  P: [phone]  F: [phone] [] [address]  P: [phone]  F: [phone] [] [address]  [facility]   [address]   [place]: [phone]  F: [phone]      Physical Therapy Daily Treatment Note    Date:  2022  Patient Name:  Jayson Gilmore    :  1960  MRN: [id number]  Physician: Karissa Cohen MD; [address]: [facility] after eval)  Medical Diagnosis: Strain of neck muscle, S16. 1XXA                      Rehab Codes: M54.2, M54.6, M25.512, M25.511, R29.3, M62.511, M62.512   Onset Date: 2022                    Next 's appt. : 3 months   Visit# / total visits:     Cancels/No Shows: 0/0    Subjective:    Pain:  [x] Yes  [] No Location: L sided cervical spine  Pain Rating: (0-10 scale) 4/10  Pain altered Tx:  [x] No  [] Yes  Action:  Comments: Pt arrives reporting bilateral mid back pain this date. Reports compliance with HEP and mentions an overall improvement in symptoms since starting therapy.      Objective:  Modalities:   Precautions:  Exercises:  Exercise Reps/ Time Weight/ Level Comments   UBE 4' L1 Fwd         Seated      Manual 15 mins  MFR and hypervolt to bilateral UT, rhomboids     UT stretch 5x15\"  HEP   Scapular retraction 20x5\"     Retro shoulder rolls 20x     Cervical ROM 5x ea  Rotation, sidebending   Chin tucks 10x5\"     Active shoulder ROM 10x ea  Flexion, abd  Performed standing    Foam ball squeeze 15x ea           Supine      Manual cervical traction   manual traction followed by passive UT stretch   Wand flexion 15x 2 lb    Wand abd 15x ea 2 lb    Wand chest press 15x 2 lb          Standing       Theraband rows 20x lime    Theraband shoulder ext 20x lime          Other:      Treatment Charges: Mins Units   []  Modalities     [x]  Ther Exercise 25 2   [x]  Manual Therapy 15 1   []  Ther Activities     []  Aquatics     []  Vasocompression     []  Other     Total Treatment time 40 3       Assessment: [x] Progressing toward goals. Good tolerance of all exercises and progressions this date. Trialed fork attachment with hypervolt this date to target tender areas with good relief of pain and tenderness noted. Progressed reps for theraband rows/shoulder extension with good tolerance just mild fatigue noted. Also added ball squeeze to progress  strength with good tolerance. Progressed to 2lb for wand exercises with improved tolerance compared to previous sessions. [] No change. [x] Other: Continued to educate on postural awareness to reduce UT compensation during sitting and standing. [x] Patient would continue to benefit from skilled physical therapy services in order to: decrease pain neck, shoulders, upper back, increase cerv ROM, increase strength B UE , and improve tolerance to sitting, standing, and driving to improve tolerance to working. STG: (to be met in 12 treatments)  1. ? Pain: Neck, upper back, L shoulder 3/10 average pain, 5/10 at worst  2. ? ROM:Cerv flex 40°, SB B 20°, B Rot WNL w/out increased pain  3. ? Strength: B Shoulder 4-/5, B elbows 4/5, finger abd L 4-/5, L  30 lbs  4. ? Function: 18% loss of neck function   5. Pt report improved tolerance to sitting, standing, and driving to improve tolerance to working   6. Patient to be independent with home exercise program as demonstrated by performance with correct form without cues.      Patient goals: \"no back strain or burning after sitting or standing\"    Pt.  Education: [x] Yes  [] No  [x] Reviewed Prior HEP/Ed  Method of Education: [x] Verbal  [x] Demo  [] Written   Comprehension of Education:  [x] Verbalizes understanding. [x] Demonstrates understanding. [] Needs review. [x] Demonstrates/verbalizes HEP/Ed previously given. Access Code: [email or web address]  URL: [email or web address]  Date: 05/16/2022  Prepared by: Spring Seo    Exercises  Seated Upper Trapezius Stretch - 1 x daily - 7 x weekly - 1 sets - 3 reps - 15 hold  Seated Cervical Rotation AROM - 1 x daily - 7 x weekly - 3 sets - 5 reps - 5 hold  Seated Scapular Retraction - 1 x daily - 7 x weekly - 2 sets - 10 reps - 5 hold  Seated Cervical Retraction - 1 x daily - 7 x weekly - 2 sets - 10 reps - 5 hold  Supine Shoulder Flexion Extension AAROM with Dowel - 1 x daily - 7 x weekly - 2 sets - 10 reps - 5 hold  Supine Shoulder Abduction AAROM with Dowel - 1 x daily - 7 x weekly - 2 sets - 10 reps - 5 hold      Plan: [x] Continue current frequency toward long and short term goals.     [x] Specific Instructions for subsequent treatments: begin postural ed, gentle neck, shoulder ex, manual cerv traction, ok for HP ES to decrease pain, tightness      Time In: 10:44am           Time Out: 11:30am    Electronically signed by:  Spring Seo PTA

## 2022-05-27 ENCOUNTER — HOSPITAL ENCOUNTER (OUTPATIENT)
Dept: PHYSICAL THERAPY | Age: 62
Setting detail: THERAPIES SERIES
Discharge: HOME | End: 2022-05-27
Payer: MEDICAID

## 2022-05-27 NOTE — FLOWSHEET NOTE
[x] [facility] &  Therapy  [address]    [phone]  F: [phone]   [] [address]  [facility] [address]   [address]  P: [phone]  F: [phone]  [] [address] &  Therapy  [address]  P: [phone]  F: [phone] [] [address]  P: [phone]  F: [phone]  [] [address] [facility]   [address]   [place]: [phone]  F: [phone]   [] [place] [address] [facility] [address]  [place]: [phone]   F: [phone]     Physical Therapy Cancel/No Show note    Date: 2022  Patient: Froylan Gomez  : 1960  MRN: [id number]    Cancels/No Shows to date:     For today's appointment patient:    [x]  Cancelled    [] Rescheduled appointment    [] No-show     Reason given by patient:    [x]  Patient ill    []  Conflicting appointment    [] No transportation      [] Conflict with work    [] No reason given    [] Weather related    [] [id number]    [] Other:      Comments:        [x] Next appointment was confirmed    Electronically signed by: Huber Pastrana PTA

## 2022-05-31 ENCOUNTER — HOSPITAL ENCOUNTER (OUTPATIENT)
Dept: PHYSICAL THERAPY | Age: 62
Setting detail: THERAPIES SERIES
Discharge: HOME | End: 2022-05-31
Payer: MEDICAID

## 2022-05-31 PROCEDURE — 97110 THERAPEUTIC EXERCISES: CPT

## 2022-05-31 PROCEDURE — 97140 MANUAL THERAPY 1/> REGIONS: CPT

## 2022-05-31 NOTE — FLOWSHEET NOTE
[x] [facility] &  Therapy  [address]  [phone]  F: [phone]         Physical Therapy Daily Treatment Note    Date:  2022  Patient Name:  Jose Elias Connors    :  1960  MRN: [id number]  Physician: Loris Dancer, MD; [address]: [facility] after eval)  Medical Diagnosis: Strain of neck muscle, S16. 1XXA                      Rehab Codes: M54.2, M54.6, M25.512, M25.511, R29.3, M62.511, M62.512   Onset Date: 2022                    Next 's appt. : 3 months   Visit# / total visits:     Cancels/No Shows: 1/0    Subjective:    Pain:  [x] Yes  [] No Location: L sided cervical spine  Pain Rating: (0-10 scale) 4/10  Pain altered Tx:  [x] No  [] Yes  Action:  Comments: pt reports that her situation is improving  Pt still getting pain in back, neck is feeling good, shoulders are feeling tight. Objective:  Modalities:   Precautions:  Exercises:  Exercise Reps/ Time Weight/ Level Comments   UBE 4' L1 Fwd         Seated      Manual 9 mins  MFR and hypervolt to bilateral UT, rhomboids     UT stretch 3x30\"  HEP   Scapular retraction 20x5\"     Retro shoulder rolls 20x     Cervical ROM 5x ea  Rotation, sidebending   Chin tucks 10x5\"     Active shoulder ROM 10x ea  Flexion, abd  Performed standing    Foam ball squeeze 15x ea           Supine      Manual cervical traction   manual traction followed by passive UT stretch   Wand flexion 15x 2 lb    Wand abd 15x ea 2 lb    Wand chest press 15x 2 lb          Standing       Theraband rows 20x lime    Theraband shoulder ext 20x lime          Other:      Treatment Charges: Mins Units   []  Modalities     [x]  Ther Exercise 35 2   [x]  Manual Therapy 9 1   []  Ther Activities     []  Aquatics     []  Vasocompression     []  Other     Total Treatment time 44 3       Assessment: [x] Progressing toward goals. pt is able to tolerate all exercises and stretches given. Pt is tense with tenderness in L upper trap and rhomboid. After massage gun the upper trap and rhomboid feels looser. Pt given lime t band for HEP and pt was educated and instructed in band ex and band set up for HEP. [] No change. [x] Other: Continued to educate on postural awareness to reduce UT compensation during sitting and standing. [x] Patient would continue to benefit from skilled physical therapy services in order to: decrease pain neck, shoulders, upper back, increase cerv ROM, increase strength B UE , and improve tolerance to sitting, standing, and driving to improve tolerance to working. STG: (to be met in 12 treatments)  1. ? Pain: Neck, upper back, L shoulder 3/10 average pain, 5/10 at worst  2. ? ROM:Cerv flex 40°, SB B 20°, B Rot WNL w/out increased pain  3. ? Strength: B Shoulder 4-/5, B elbows 4/5, finger abd L 4-/5, L  30 lbs  4. ? Function: 18% loss of neck function   5. Pt report improved tolerance to sitting, standing, and driving to improve tolerance to working   6. Patient to be independent with home exercise program as demonstrated by performance with correct form without cues.      Patient goals: \"no back strain or burning after sitting or standing\"    Pt. Education:  [x] Yes  [] No  [x] Reviewed Prior HEP/Ed  Method of Education: [x] Verbal  [x] Demo  [] Written   Comprehension of Education:  [x] Verbalizes understanding. [x] Demonstrates understanding. [] Needs review. [x] Demonstrates/verbalizes HEP/Ed previously given. 5/16 HEP-Access Code: [email or web address]  URL: [email or web address]  Prepared by: Gerald Low  Seated Upper Trapezius Stretch - 1 x daily - 7 x weekly - 1 sets - 3 reps - 15 hold  Seated Cervical Rotation AROM - 1 x daily - 7 x weekly - 3 sets - 5 reps - 5 hold  Seated Scapular Retraction - 1 x daily - 7 x weekly - 2 sets - 10 reps - 5 hold  Seated Cervical Retraction - 1 x daily - 7 x weekly - 2 sets - 10 reps - 5 hold  Supine Shoulder Flexion Extension AAROM with Dowel - 1 x daily - 7 x weekly - 2 sets - 10 reps - 5 hold  Supine Shoulder Abduction AAROM with Dowel - 1 x daily - 7 x weekly - 2 sets - 10 reps - 5 hold    5/31 HEP -Access Code: [email or web address]  URL: [email or web address]  Shoulder extension with resistance - Neutral - 2 x daily - 7 x weekly - 30 reps  Standing Shoulder Row with Anchored Resistance - 2 x daily - 7 x weekly - 30 reps      Plan: [x] Continue current frequency toward long and short term goals.     [x] Specific Instructions for subsequent treatments: begin postural ed, gentle neck, shoulder ex, manual cerv traction, ok for HP ES to decrease pain, tightness      Time In: 1010          Time Out: 1100    Electronically signed by:  Devon Raymond  Patient treated and note written by Devon Raymond, Student PT under direct supervision of Bijal Sotelo, PT.

## 2022-06-03 ENCOUNTER — HOSPITAL ENCOUNTER (OUTPATIENT)
Dept: PHYSICAL THERAPY | Age: 62
Setting detail: THERAPIES SERIES
Discharge: HOME | End: 2022-06-03
Payer: MEDICAID

## 2022-06-03 NOTE — FLOWSHEET NOTE
[x] [facility]) [facility] &  Therapy  [address]    [phone]  F: [phone]   [] [address]  [facility] [address]   [address]  P: [phone]  F: [phone]  [] [address] &  Therapy  [address] [facility]  P: [phone]  F: [phone] [] [email or web address]  P: [phone]  F: [phone]  [] [address]  [facility]   [address]   [place]: [phone]  F: [phone]   [] [place] [address] [facility] [address]  [place]: [phone]   F: [phone]     Physical Therapy Cancel/No Show note    Date: 6/3/2022  Patient: Akua Ross  : 1960  MRN: [id number]    Cancels/No Shows to date: 0    For today's appointment patient:    [x]  Cancelled    [] Rescheduled appointment    [] No-show     Reason given by patient:    [x]  Patient ill    []  Conflicting appointment    [] No transportation      [] Conflict with work    [] No reason given    [] Weather related    [] COVID-19    [] Other:      Comments:        [x] Next appointment was confirmed    Electronically signed by: Ileana Metcalf PTA

## 2022-06-06 ENCOUNTER — HOSPITAL ENCOUNTER (OUTPATIENT)
Dept: PHYSICAL THERAPY | Age: 62
Setting detail: THERAPIES SERIES
Discharge: HOME | End: 2022-06-06
Payer: MEDICAID

## 2022-06-06 PROCEDURE — 97140 MANUAL THERAPY 1/> REGIONS: CPT

## 2022-06-06 PROCEDURE — 97110 THERAPEUTIC EXERCISES: CPT

## 2022-06-06 NOTE — FLOWSHEET NOTE
[x] [facility] &  Therapy  [address]  [phone]  F: [phone]         Physical Therapy Daily Treatment Note    Date:  2022  Patient Name:  Fernandez Johnson    :  1960  MRN: [id number]  Physician: Jalil Rios MD; [address]: [facility] [place] after eval)  Medical Diagnosis: Strain of neck muscle, S16. 1XXA                      Rehab Codes: M54.2, M54.6, M25.512, M25.511, R29.3, M62.511, M62.512   Onset Date: 2022                    Next 's appt. : 3 months   Visit# / total visits:     Cancels/No Shows: 2/0    Subjective:    Pain:  [x] Yes  [] No Location: L sided cervical spine  Pain Rating: (0-10 scale) 4/10  Pain altered Tx:  [x] No  [] Yes  Action:  Comments: pt reports that her situation is improving  Pt still getting pain in back, neck is feeling good, shoulders are feeling tight. Objective:  Modalities:   Precautions:  Exercises:  Exercise Reps/ Time Weight/ Level Comments   UBE 4' L1 Fwd         Seated      Manual 9 mins  MFR and hypervolt to bilateral UT, rhomboids     UT stretch 6x10\"  HEP   Scapular retraction 20x5\"     Retro shoulder rolls 20x     Cervical ROM 5x ea  Rotation, sidebending   Chin tucks 10x5\"     Active shoulder ROM 10x ea  Flexion, abd  Performed standing    Foam ball squeeze 15x ea           Supine      Manual cervical traction x  manual traction followed by UT stretch and minor manual to neck paraspinals   Wand flexion 15x 2 lb    Wand abd 15x ea 2 lb    Wand chest press 15x 2 lb          Standing       Theraband rows 20x lime    Theraband shoulder ext 20x lime    L side IR/ER 2x10 lime    Other:      Treatment Charges: Mins Units   []  Modalities     [x]  Ther Exercise 41 3   [x]  Manual Therapy 20 1   []  Ther Activities     []  Aquatics     []  Vasocompression     []  Other     Total Treatment time 61 4       Assessment: [x] Progressing toward goals.  Added L side shoulder IR/ER to improve shoulder strength. Pt with good tolerance to all activities noting minor increase in pain during IR/ER. Continued with manual in both sitting and supine with pt noting relief in both positions. Pt stated at the end of treatment she felt good and that she had some relief from her neck tension and pain. [] No change. [x] Other: Continued to educate on postural awareness to reduce UT compensation during sitting and standing. [x] Patient would continue to benefit from skilled physical therapy services in order to: decrease pain neck, shoulders, upper back, increase cerv ROM, increase strength B UE , and improve tolerance to sitting, standing, and driving to improve tolerance to working. STG: (to be met in 12 treatments)  1. ? Pain: Neck, upper back, L shoulder 3/10 average pain, 5/10 at worst  2. ? ROM:Cerv flex 40°, SB B 20°, B Rot WNL w/out increased pain  3. ? Strength: B Shoulder 4-/5, B elbows 4/5, finger abd L 4-/5, L  30 lbs  4. ? Function: 18% loss of neck function   5. Pt report improved tolerance to sitting, standing, and driving to improve tolerance to working   6. Patient to be independent with home exercise program as demonstrated by performance with correct form without cues.      Patient goals: \"no back strain or burning after sitting or standing\"    Pt. Education:  [x] Yes  [] No  [x] Reviewed Prior HEP/Ed  Method of Education: [x] Verbal  [x] Demo  [] Written   Comprehension of Education:  [x] Verbalizes understanding. [x] Demonstrates understanding. [] Needs review. [x] Demonstrates/verbalizes HEP/Ed previously given. 5/16 HEP-Access Code: [email or web address]  URL: [email or web address]  Prepared by: Dillan Frederick  Seated Upper Trapezius Stretch - 1 x daily - 7 x weekly - 1 sets - 3 reps - 15 hold  Seated Cervical Rotation AROM - 1 x daily - 7 x weekly - 3 sets - 5 reps - 5 hold  Seated Scapular Retraction - 1 x daily - 7 x weekly - 2 sets - 10 reps - 5 hold  Seated Cervical Retraction - 1 x daily - 7 x weekly - 2 sets - 10 reps - 5 hold  Supine Shoulder Flexion Extension AAROM with Dowel - 1 x daily - 7 x weekly - 2 sets - 10 reps - 5 hold  Supine Shoulder Abduction AAROM with Dowel - 1 x daily - 7 x weekly - 2 sets - 10 reps - 5 hold    5/31 HEP -Access Code: [email or web address]  URL: [employer]/  Shoulder extension with resistance - Neutral - 2 x daily - 7 x weekly - 30 reps  Standing Shoulder Row with Anchored Resistance - 2 x daily - 7 x weekly - 30 reps      Plan: [x] Continue current frequency toward long and short term goals.     [x] Specific Instructions for subsequent treatments: begin postural ed, gentle neck, shoulder ex, manual cerv traction, ok for HP ES to decrease pain, tightness      Time In: 0910          Time Out: 1015    Electronically signed by:  Talib Wya PTA

## 2022-06-07 ENCOUNTER — APPOINTMENT (OUTPATIENT)
Dept: PHYSICAL THERAPY | Age: 62
End: 2022-06-07
Payer: MEDICAID

## 2022-06-09 ENCOUNTER — HOSPITAL ENCOUNTER (OUTPATIENT)
Dept: PHYSICAL THERAPY | Age: 62
Setting detail: THERAPIES SERIES
Discharge: HOME | End: 2022-06-09
Payer: MEDICAID

## 2022-06-09 PROCEDURE — 97110 THERAPEUTIC EXERCISES: CPT

## 2022-06-09 PROCEDURE — 97140 MANUAL THERAPY 1/> REGIONS: CPT

## 2022-06-09 NOTE — FLOWSHEET NOTE
[x] [facility] &  Therapy  [address]  [phone]  F: [phone]         Physical Therapy Daily Treatment Note    Date:  2022  Patient Name:  Aron Saenz    :  1960  MRN: [id number]  Physician: Huel Severe, MD; [address]: [facility] 12 Rx )  Approved  There Ex 72218 48 units  There Activ 99920 48 units  Manual 75650 48 uints  US 04690 48 units  ES attended 93204 48 units  Traction 67217 16 units  Medical Diagnosis: Strain of neck muscle, S16. 1XXA                      Rehab Codes: M54.2, M54.6, M25.512, M25.511, R29.3, M62.511, M62.512   Onset Date: 2022                    Next 's appt. : 3 months   Visit# / total visits:     Cancels/No Shows: 2/0    Subjective:    Pain:  [x] Yes  [] No Location: L sided cervical spine into back Pain Rating: (0-10 scale) 3/10  Pain altered Tx:  [x] No  [] Yes  Action:  Comments: Pt reports moderate tightness, nagging neck area. Still has occasional pain running up and down in back. Neck is not as intense after hypervolt. Feels she is progressing as everything but neck is improving. No longer having symptoms in arms but is still weak in hands. Pt reprots doing tband ex at home.       Objective:  Modalities:   Precautions:  Exercises:  Exercise Reps/ Time Weight/ Level Comments   UBE 4' L1 Fwd         Seated      Manual 7 mins  MFR and hypervolt to bilateral UT, rhomboids     UT stretch [id number]  B   Scapular retraction 20x5\"     Retro shoulder rolls 20x     Cervical ROM 5x ea 3sec Rotation   Chin tucks      Active shoulder ROM 10x2 ea  Flexion, abd, standing   Foam ball squeeze            Supine      Manual cervical traction 5 min  manual traction, w/sub-occ release   Cerv Retractions  20x 3sec Added    pec stretch       Wand flexion  2 lb    Wand abd  2 lb    Wand chest press  2 lb          Standing       Theraband rows 20x lime Tband triceps    Add soon   Theraband shoulder ext 20x lime 6/9 L feels weaker   L IR, ER 2x10 lime    Other:      Treatment Charges: Mins Units   []  Modalities     [x]  Ther Exercise 42 3   [x]  Manual Therapy 12 1   []  Ther Activities     []  Aquatics     []  Vasocompression     []  Other     Total Treatment time 54 min 4       Assessment: [x] Progressing toward goals. Initiated supine cerv retractions to increase circulation to post neck, improve posture and decrease stress on post neck. At end of Rx, after manual, Pt reports \"that felt good, I'm a little bit softer, I'm not as tight as I was. \"    [] No change. [] Other:     [x] Patient would continue to benefit from skilled physical therapy services in order to: decrease pain neck, shoulders, upper back, increase cerv ROM, increase strength B UE , and improve tolerance to sitting, standing, and driving to improve tolerance to working. STG: (to be met in 12 treatments)  1. ? Pain: Neck, upper back, L shoulder 3/10 average pain, 5/10 at worst  2. ? ROM:Cerv flex 40°, SB B 20°, B Rot WNL w/out increased pain  3. ? Strength: B Shoulder 4-/5, B elbows 4/5, finger abd L 4-/5, L  30 lbs  4. ? Function: 18% loss of neck function   5. Pt report improved tolerance to sitting, standing, and driving to improve tolerance to working   6. Patient to be independent with home exercise program as demonstrated by performance with correct form without cues.      Patient goals: \"no back strain or burning after sitting or standing\"    Pt. Education:  [x] Yes  [] No  [x] Reviewed Prior HEP/Ed  Method of Education: [x] Verbal  [x] Demo  [] Written   Comprehension of Education:  [x] Verbalizes understanding-supine cerv retractions and upper trap stretch for HEP. [x] Demonstrates understanding. [] Needs review. [x] Demonstrates/verbalizes HEP/Ed previously given. 5/16 HEP-Access Code: [email or web address]  URL: [email or web address]  Prepared by: Yonas Ríos

## 2022-06-13 ENCOUNTER — HOSPITAL ENCOUNTER (OUTPATIENT)
Dept: PHYSICAL THERAPY | Age: 62
Setting detail: THERAPIES SERIES
Discharge: HOME | End: 2022-06-13
Payer: MEDICAID

## 2022-06-14 ENCOUNTER — APPOINTMENT (OUTPATIENT)
Dept: PHYSICAL THERAPY | Age: 62
End: 2022-06-14
Payer: MEDICAID

## 2022-06-16 ENCOUNTER — HOSPITAL ENCOUNTER (OUTPATIENT)
Dept: PHYSICAL THERAPY | Age: 62
Setting detail: THERAPIES SERIES
Discharge: HOME | End: 2022-06-16
Payer: MEDICAID

## 2022-06-16 PROCEDURE — 97110 THERAPEUTIC EXERCISES: CPT

## 2022-06-16 PROCEDURE — 97140 MANUAL THERAPY 1/> REGIONS: CPT

## 2022-06-16 NOTE — FLOWSHEET NOTE
[x] [facility] &  Therapy  [address]  [phone]  F: [phone]         Physical Therapy Daily Treatment Note    Date:  2022  Patient Name:  Layo Trevino    :  1960  MRN: [id number]  Physician: Glo Pfeiffer MD; [address]: [facility] 12 Rx until 2022)  Approved  There Ex 84729 48 units  There Activ 35722 48 units  Manual 66529 48 uints  US 38977 48 units  ES attended 65306 48 units  Traction 97665 31 units  Medical Diagnosis: Strain of neck muscle, S16. 1XXA                      Rehab Codes: M54.2, M54.6, M25.512, M25.511, R29.3, M62.511, M62.512   Onset Date: 2022                    Next 's appt. : 3 months   Visit# / total visits:     Cancels/No Shows: 3/0    Subjective:    Pain:  [x] Yes  [] No Location: L sided cervical spine into back Pain Rating: (0-10 scale) 3/10  Pain altered Tx:  [x] No  [] Yes  Action:  Comments: Pt reports whole R side is tight today. Pt admits she has not been doing her exercises at home this week, has been busy w/car problems.       Objective:  Modalities:   Precautions:  Exercises:  Exercise Reps/ Time Weight/ Level Comments   UBE 4' L1 Fwd   Standing       Corner stretch 3x 30sec    Active shoulder ROM, B 10x2 ea  Flexion, abd         Seated      Manual 9 mins  MFR and hypervolt to bilateral UT, rhomboids, seated in chair     UT stretch [id number]  B   Scapular retraction 20x5\"     Retro shoulder rolls 20x     Cervical ROM 10x ea 3sec Rotation, progressed reps    Chin tucks            Foam ball squeeze            Supine      Manual cervical traction -- min  manual traction, w/sub-occ release   Cerv Retractions  20x 5sec    pec stretch       Wand flexion  2 lb    Wand abd  2 lb    Wand chest press  2 lb          Standing       Theraband rows 20x lime    Tband triceps  15x lime Added    Theraband shoulder ext 20x lime    L IR, ER 2x10 lime Other:      Treatment Charges: Mins Units   []  Modalities     [x]  Ther Exercise 44 3   [x]  Manual Therapy 10 1   []  Ther Activities     []  Aquatics     []  Vasocompression     []  Other     Total Treatment time 54 min 4       Assessment: [x] Progressing toward goals. Initiated t-band triceps to improve pushing and transfers. Emphasized to Pt importance of performing ex at home. Cont manual at end of Rx, Pt reports it really loosens her up, feels better after, less tight. Pt questioning how long it will take for muscle to get soft again. [] No change. [] Other:     [x] Patient would continue to benefit from skilled physical therapy services in order to: decrease pain neck, shoulders, upper back, increase cerv ROM, increase strength B UE , and improve tolerance to sitting, standing, and driving to improve tolerance to working. STG: (to be met in 12 treatments)  1. ? Pain: Neck, upper back, L shoulder 3/10 average pain, 5/10 at worst  2. ? ROM:Cerv flex 40°, SB B 20°, B Rot WNL w/out increased pain  3. ? Strength: B Shoulder 4-/5, B elbows 4/5, finger abd L 4-/5, L  30 lbs  4. ? Function: 18% loss of neck function   5. Pt report improved tolerance to sitting, standing, and driving to improve tolerance to working   6. Patient to be independent with home exercise program as demonstrated by performance with correct form without cues.      Patient goals: \"no back strain or burning after sitting or standing\"    Pt. Education:  [x] Yes  [] No  [x] Reviewed Prior HEP/Ed  Method of Education: [x] Verbal  [x] Demo  [] Written   Comprehension of Education:  [x] Verbalizes understanding-supine cerv retractions and upper trap stretch for HEP. [x] Demonstrates understanding. [] Needs review. [x] Demonstrates/verbalizes HEP/Ed previously given. 5/16 HEP-Access Code: [email or web address]  URL: [email or web address]  Prepared by: Adelita Sol  Seated Upper Trapezius Stretch - 1 x daily - 7 x weekly - 1 sets - 3 reps - 15 hold  Seated Cervical Rotation AROM - 1 x daily - 7 x weekly - 3 sets - 5 reps - 5 hold  Seated Scapular Retraction - 1 x daily - 7 x weekly - 2 sets - 10 reps - 5 hold  Seated Cervical Retraction - 1 x daily - 7 x weekly - 2 sets - 10 reps - 5 hold  Supine Shoulder Flexion Extension AAROM with Dowel - 1 x daily - 7 x weekly - 2 sets - 10 reps - 5 hold  Supine Shoulder Abduction AAROM with Dowel - 1 x daily - 7 x weekly - 2 sets - 10 reps - 5 hold    5/31 HEP -Access Code: [email or web address]  URL: [employer]/  Shoulder extension with resistance - Neutral - 2 x daily - 7 x weekly - 30 reps  Standing Shoulder Row with Anchored Resistance - 2 x daily - 7 x weekly - 30 reps      Plan: [x] Continue current frequency toward long and short term goals.     [x] Specific Instructions for subsequent treatments: postural ed, progress neck, shoulder ex, ok for HP ES to decrease pain, tightness; update HEP soon      Time In: 0903          Time Out: 1002    Electronically signed by:  Ag Norman, PT

## 2022-06-20 ENCOUNTER — HOSPITAL ENCOUNTER (OUTPATIENT)
Dept: PHYSICAL THERAPY | Age: 62
Setting detail: THERAPIES SERIES
Discharge: HOME | End: 2022-06-20
Payer: MEDICAID

## 2022-06-20 PROCEDURE — 97110 THERAPEUTIC EXERCISES: CPT

## 2022-06-20 PROCEDURE — 97140 MANUAL THERAPY 1/> REGIONS: CPT

## 2022-06-20 NOTE — FLOWSHEET NOTE
[x] [facility] &  Therapy  [address]  [phone]  F: [phone]         Physical Therapy Daily Treatment Note    Date:  2022  Patient Name:  Akua Ross    :  1960  MRN: [id number]  Physician: Arnel Reyes MD; [address]: [facility] 12 Rx until 2022)  Approved  There Ex 79011 48 units  There Activ 76075 48 units  Manual 32627 48 uints  US 93227 48 units  ES attended 17627 48 units  Traction 89242 99 units  Medical Diagnosis: Strain of neck muscle, S16. 1XXA                      Rehab Codes: M54.2, M54.6, M25.512, M25.511, R29.3, M62.511, M62.512   Onset Date: 2022                    Next 's appt. : 3 months   Visit# / total visits:     Cancels/No Shows: 3/0    Subjective:    Pain:  [x] Yes  [] No Location: L sided cervical spine into back Pain Rating: (0-10 scale) 2/10  Pain altered Tx:  [x] No  [] Yes  Action:  Comments: Pt noted that she is feeling better today than last session stated she still feels some tightness but not as much.       Objective:  Modalities:   Precautions:  Exercises:  Exercise Reps/ Time Weight/ Level Comments   UBE 4' L1 Fwd   Standing       Corner stretch 3x 30sec    Active shoulder ROM, B 10x2 ea  Flexion, abd         Seated      Manual 9 mins  MFR and hypervolt to bilateral UT, rhomboids, seated in chair     UT stretch [id number]  B   Scapular retraction 20x5\"     Retro shoulder rolls 20x     Cervical ROM 10x ea 3sec Rotation, progressed reps    Chin tucks            Foam ball squeeze            Supine      Manual cervical traction -- min  manual traction, w/sub-occ release   Cerv Retractions  20x 5sec Performed in seated   pec stretch       Wand flexion  2 lb    Wand abd  2 lb    Wand chest press  2 lb          Standing       Theraband rows 20x blue    Tband triceps  2x10 blue Added    Theraband shoulder ext 20x blue    L IR, ER 2x10 blue For ER: 1 set lime, 1 set blue    Other:      Treatment Charges: Mins Units   []  Modalities     [x]  Ther Exercise 43 3   [x]  Manual Therapy 10 1   []  Ther Activities     []  Aquatics     []  Vasocompression     []  Other     Total Treatment time 53 min 4       Assessment: [x] Progressing toward goals. Pt with good tolerance to all activities, minor increase in pain with progressions to ER but decreased when resumed less resistance. Progressed resistance band activities to improve shoulder and upper back strength. Pt received manual to bilateral UT in seated via hypervolt to decrease muscle tension and pain. [] No change. [] Other:     [x] Patient would continue to benefit from skilled physical therapy services in order to: decrease pain neck, shoulders, upper back, increase cerv ROM, increase strength B UE , and improve tolerance to sitting, standing, and driving to improve tolerance to working. STG: (to be met in 12 treatments)  1. ? Pain: Neck, upper back, L shoulder 3/10 average pain, 5/10 at worst  2. ? ROM:Cerv flex 40°, SB B 20°, B Rot WNL w/out increased pain  3. ? Strength: B Shoulder 4-/5, B elbows 4/5, finger abd L 4-/5, L  30 lbs  4. ? Function: 18% loss of neck function   5. Pt report improved tolerance to sitting, standing, and driving to improve tolerance to working   6. Patient to be independent with home exercise program as demonstrated by performance with correct form without cues.      Patient goals: \"no back strain or burning after sitting or standing\"    Pt. Education:  [x] Yes  [] No  [x] Reviewed Prior HEP/Ed  Method of Education: [x] Verbal  [x] Demo  [] Written   Comprehension of Education:  [x] Verbalizes understanding-supine cerv retractions and upper trap stretch for HEP. [x] Demonstrates understanding. [] Needs review. [x] Demonstrates/verbalizes HEP/Ed previously given. 5/16 HEP-Access Code: [email or web address]  URL: [email or web address]  Prepared by: Bhpuendra Angeles Diaz  Seated Upper Trapezius Stretch - 1 x daily - 7 x weekly - 1 sets - 3 reps - 15 hold  Seated Cervical Rotation AROM - 1 x daily - 7 x weekly - 3 sets - 5 reps - 5 hold  Seated Scapular Retraction - 1 x daily - 7 x weekly - 2 sets - 10 reps - 5 hold  Seated Cervical Retraction - 1 x daily - 7 x weekly - 2 sets - 10 reps - 5 hold  Supine Shoulder Flexion Extension AAROM with Dowel - 1 x daily - 7 x weekly - 2 sets - 10 reps - 5 hold  Supine Shoulder Abduction AAROM with Dowel - 1 x daily - 7 x weekly - 2 sets - 10 reps - 5 hold    5/31 HEP -Access Code: [email or web address]  URL: [email or web address]  Shoulder extension with resistance - Neutral - 2 x daily - 7 x weekly - 30 reps  Standing Shoulder Row with Anchored Resistance - 2 x daily - 7 x weekly - 30 reps      Plan: [x] Continue current frequency toward long and short term goals.     [x] Specific Instructions for subsequent treatments: postural ed, progress neck, shoulder ex, ok for HP ES to decrease pain, tightness; update HEP soon      Time In: 0908          Time Out: 1001    Electronically signed by:  Micha Butler PTA

## 2022-06-23 ENCOUNTER — HOSPITAL ENCOUNTER (OUTPATIENT)
Dept: PHYSICAL THERAPY | Age: 62
Setting detail: THERAPIES SERIES
Discharge: HOME | End: 2022-06-23
Payer: MEDICAID

## 2022-06-23 PROCEDURE — 97140 MANUAL THERAPY 1/> REGIONS: CPT

## 2022-06-23 NOTE — FLOWSHEET NOTE
[x] [facility] &  Therapy  [address]  [phone]  F: [phone]     Physical Therapy Daily Treatment Note    Date:  2022  Patient Name:  Per Santana    :  1960  MRN: [id number]  Physician: Roselyn Srinivasan MD; [address]: [facility] 12 Rx until 2022)  Approved  There Ex 39549 48 units  There Activ 32366 48 units  Manual 05165 48 uints  US 82679 48 units  ES attended 71224 48 units  Traction 41723 07 units  Medical Diagnosis: Strain of neck muscle, S16. 1XXA                      Rehab Codes: M54.2, M54.6, M25.512, M25.511, R29.3, M62.511, M62.512   Onset Date: 2022                    Next 's appt. : 3 months   Visit# / total visits:  (corrected on )   Cancels/No Shows: 3/0    Subjective:    Pain:  [x] Yes  [] No Location: L sided cervical spine into back Pain Rating: (0-10 scale) 3-4/10  Pain altered Tx:  [x] No  [] Yes  Action:  Comments: Patient arrives today stating that she feels overall improvement in her neck and is just dealing with upper thoracic pain. Patient states that she needs to leave by 9:30 today due to a conflicting work appointment.     Objective:  Modalities:   Precautions:  Exercises:  Exercise Reps/ Time Weight/ Level Comments   UBE 4' L1 Fwd   Standing       Corner stretch 3x 30sec    Active shoulder ROM, B 10x2 ea  Flexion, abd         Seated      Manual 24  MFR and hypervolt to bilateral UT/MT/LT, rhomboids, levator scap seated in chair     UT stretch [id number]  B   Scapular retraction 20x5\"     Retro shoulder rolls 20x     Cervical ROM 10x ea 3sec Rotation, progressed reps    Chin tucks            Foam ball squeeze            Supine      Manual cervical traction -- min  manual traction, w/sub-occ release   Cerv Retractions  20x 5sec Performed in seated   pec stretch       Wand flexion  2 lb    Wand abd  2 lb    Wand chest press  2 lb          Standing Theraband rows 20x blue    Tband triceps  2x10 blue Added 6/16   Theraband shoulder ext 20x blue    L IR, ER 2x10 blue For ER: 1 set lime, 1 set blue    Other:      Treatment Charges: Mins Units   []  Modalities     [x]  Ther Exercise     [x]  Manual Therapy 24 2   []  Ther Activities     []  Aquatics     []  Vasocompression     []  Other     Total Treatment time 24 2   --warm up not billable time    Assessment: [x] Progressing toward goals. Initiated session on UBE in order to improve peripheral blood flow and improve upright posture. Continued with only manual on this date due to patient time constraints and reporting completing exercises at home. Added middle and lower trap along with levator hypervolt on this date. Patient reports improvement of symptoms following manual.    [] No change. [] Other:     [x] Patient would continue to benefit from skilled physical therapy services in order to: decrease pain neck, shoulders, upper back, increase cerv ROM, increase strength B UE , and improve tolerance to sitting, standing, and driving to improve tolerance to working. STG: (to be met in 12 treatments)  1. ? Pain: Neck, upper back, L shoulder 3/10 average pain, 5/10 at worst  2. ? ROM:Cerv flex 40°, SB B 20°, B Rot WNL w/out increased pain  3. ? Strength: B Shoulder 4-/5, B elbows 4/5, finger abd L 4-/5, L  30 lbs  4. ? Function: 18% loss of neck function   5. Pt report improved tolerance to sitting, standing, and driving to improve tolerance to working   6. Patient to be independent with home exercise program as demonstrated by performance with correct form without cues.      Patient goals: \"no back strain or burning after sitting or standing\"    Pt. Education:  [x] Yes  [] No  [x] Reviewed Prior HEP/Ed  Method of Education: [x] Verbal  [x] Demo  [] Written   Comprehension of Education:  [x] Verbalizes understanding-supine cerv retractions and upper trap stretch for HEP.   [x] Demonstrates understanding. [] Needs review. [x] Demonstrates/verbalizes HEP/Ed previously given. 5/16 HEP-Access Code: [email or web address]  URL: [employer]/  Prepared by: James Weir  Seated Upper Trapezius Stretch - 1 x daily - 7 x weekly - 1 sets - 3 reps - 15 hold  Seated Cervical Rotation AROM - 1 x daily - 7 x weekly - 3 sets - 5 reps - 5 hold  Seated Scapular Retraction - 1 x daily - 7 x weekly - 2 sets - 10 reps - 5 hold  Seated Cervical Retraction - 1 x daily - 7 x weekly - 2 sets - 10 reps - 5 hold  Supine Shoulder Flexion Extension AAROM with Dowel - 1 x daily - 7 x weekly - 2 sets - 10 reps - 5 hold  Supine Shoulder Abduction AAROM with Dowel - 1 x daily - 7 x weekly - 2 sets - 10 reps - 5 hold    5/31 HEP -Access Code: [email or web address]  URL: [employer]/  Shoulder extension with resistance - Neutral - 2 x daily - 7 x weekly - 30 reps  Standing Shoulder Row with Anchored Resistance - 2 x daily - 7 x weekly - 30 reps      Plan: [x] Continue current frequency toward long and short term goals.     [x] Specific Instructions for subsequent treatments: postural ed, progress neck, shoulder ex, ok for HP ES to decrease pain, tightness; update HEP soon      Time In: 0903          Time Out: 0931    Electronically signed by:  Caro De La Vega, PT

## 2022-06-27 ENCOUNTER — HOSPITAL ENCOUNTER (OUTPATIENT)
Dept: PHYSICAL THERAPY | Age: 62
Setting detail: THERAPIES SERIES
Discharge: HOME | End: 2022-06-27
Payer: MEDICAID

## 2022-06-27 PROCEDURE — 97140 MANUAL THERAPY 1/> REGIONS: CPT

## 2022-06-27 PROCEDURE — 97110 THERAPEUTIC EXERCISES: CPT

## 2022-06-27 NOTE — FLOWSHEET NOTE
[x] [facility]  [address]  [phone]  F: [phone]     Physical Therapy Daily Treatment Note    Date:  2022  Patient Name:  Neyda Wren    :  1960  MRN: [id number]  Physician: Mykel Corona MD; 5 [address]: [facility] 12 Rx until 2022)  Approved  There Ex 97729 48 units  There Activ 56909 48 units  Manual 82020 48 uints  US 62710 48 units  ES attended 85734 48 units  Traction 21420 74 units  Medical Diagnosis: Strain of neck muscle, S16. 1XXA                      Rehab Codes: M54.2, M54.6, M25.512, M25.511, R29.3, M62.511, M62.512   Onset Date: 2022                    Next 's appt. : 3 months   Visit# / total visits:  (corrected on )   Cancels/No Shows: 3/0    Subjective:    Pain:  [x] Yes  [] No Location: L sided cervical spine into back Pain Rating: (0-10 scale) 2/10  Pain altered Tx:  [x] No  [] Yes  Action:  Comments: Patient reported that she is feeling better since last treatment and didn't have any pain for 2 days after treatment. Pt stated that she has all HEP and supplies she needs to complete activities at home.      Objective:  Modalities:   Precautions:  Exercises:  Exercise Reps/ Time Weight/ Level Comments   UBE 4' L1 Fwd   Standing       Corner stretch 3x 30sec    Active shoulder ROM, B 10x2 ea  Flexion, abd         Seated      Manual 24  MFR and hypervolt to bilateral UT/MT/LT, rhomboids, levator scap seated in chair     UT stretch [id number]  B   Scapular retraction 20x5\"     Retro shoulder rolls 20x     Cervical ROM 10x ea 3sec Rotation, progressed reps    Chin tucks            Foam ball squeeze            Supine      Manual cervical traction -- min  manual traction, w/sub-occ release   Cerv Retractions  20x 5sec Performed in seated   pec stretch       Wand flexion  2 lb    Wand abd  2 lb    Wand chest press  2 lb          Standing Theraband rows 20x blue    Tband triceps  2x10 blue Added 6/16   Theraband shoulder ext 20x blue    L IR, ER 2x10 blue For ER: 1 set lime, 1 set blue    Other:      Treatment Charges: Mins Units   []  Modalities     [x]  Ther Exercise 30 2   [x]  Manual Therapy 18 1   []  Ther Activities     []  Aquatics     []  Vasocompression     []  Other     Total Treatment time 48 3   --warm up not billable time    Assessment: [x] Progressing toward goals. Continued with all activities as listed above with focus on technique due to pt possibly being discharged. Goal check completed, see below. Pt stated she has HEP and bands she needs to continue at home if she is discharged. Pt received manual via hypervolt as listed above to decrease pain and muscle tension. [] No change. [] Other:     [x] Patient would continue to benefit from skilled physical therapy services in order to: decrease pain neck, shoulders, upper back, increase cerv ROM, increase strength B UE , and improve tolerance to sitting, standing, and driving to improve tolerance to working. STG: (to be met in 12 treatments)  1. ? Pain: Neck, upper back, L shoulder 3/10 average pain, 5/10 at worst Progress avg 3/10, 7/10 at worst when she really over does it. 2. ? ROM:Cerv flex 40°, SB B 20°, B Rot WNL w/out increased pain flex 42 MET, SB 15 bilateral NOT MET, R rot 55 deg, L rot 50 deg  3. ? Strength: B Shoulder 4-/5, B elbows 4/5, finger abd L 4-/5, L  30 lbs B shoulder MET, B elbows 5/5  MET, Finger abd L 5/5 Met, L  MET 42 lbs   4. ? Function: 18% loss of neck function MET 18%   5. Pt report improved tolerance to sitting, standing, and driving to improve tolerance to working MET  6. Patient to be independent with home exercise program as demonstrated by performance with correct form without cues. MET      Patient goals: \"no back strain or burning after sitting or standing\"    Pt.  Education:  [x] Yes  [] No  [x] Reviewed Prior HEP/Ed  Method of Education: [x] Verbal  [x] Demo  [] Written   Comprehension of Education:  [x] Verbalizes understanding-supine cerv retractions and upper trap stretch for HEP. [x] Demonstrates understanding. [] Needs review. [x] Demonstrates/verbalizes HEP/Ed previously given. 5/16 HEP-Access Code: [email or web address]  URL: [employer]/  Prepared by: Jourdan Cullen  Seated Upper Trapezius Stretch - 1 x daily - 7 x weekly - 1 sets - 3 reps - 15 hold  Seated Cervical Rotation AROM - 1 x daily - 7 x weekly - 3 sets - 5 reps - 5 hold  Seated Scapular Retraction - 1 x daily - 7 x weekly - 2 sets - 10 reps - 5 hold  Seated Cervical Retraction - 1 x daily - 7 x weekly - 2 sets - 10 reps - 5 hold  Supine Shoulder Flexion Extension AAROM with Dowel - 1 x daily - 7 x weekly - 2 sets - 10 reps - 5 hold  Supine Shoulder Abduction AAROM with Dowel - 1 x daily - 7 x weekly - 2 sets - 10 reps - 5 hold    5/31 HEP -Access Code: [email or web address]  URL: [employer]/  Shoulder extension with resistance - Neutral - 2 x daily - 7 x weekly - 30 reps  Standing Shoulder Row with Anchored Resistance - 2 x daily - 7 x weekly - 30 reps      Plan: [] Continue current frequency toward long and short term goals.     [] Specific Instructions for subsequent treatments: postural ed, progress neck, shoulder ex, ok for HP ES to decrease pain, tightness; update HEP soon      Time In: 0911          Time Out: 1003    Electronically signed by:  Zari Abraham PTA

## 2022-06-27 NOTE — PROGRESS NOTES
[x] [address] &  Therapy  [address]  [phone]  F: [phone]         Physical Therapy Progress Note    Date: 2022      Patient: William Whitman  : 1960  MRN: [id number]    Delfino Burnham MD; [facility]ast[facility]er [facility]  Insurance: [facility]   Medical Diagnosis: Strain of neck muscle, S16. 8VOU                      Rehab Codes: M54.2, M54.6, M25.512, M25.511, R29.3, M62.511, M62.512   Onset Date: 2022                    Next 's appt. : 3 months    Total visits attended: 15  Cancels/No shows: 3/0  Date of initial visit: 2022                Date of final visit: 2022      Subjective:  Pain:  [x]? Yes  []? No   Location: L sided cervical spine into back      Pain Rating: (0-10 scale) 2/10  Pain altered Tx:  [x]? No  []? Yes  Action:  Comments: Patient reports that she is feeling better since beginning PT, no pain for 2 days after treatments. Avg pain 3/10, 7/10 at worst when she really over does it. Objective:  Test Measurements: AROM Cerv flex 42, SB 15 bilateral, R rot 55 deg, L rot 50 deg   Strength-B shoulder 4-/5, B elbows 5/5, Finger abd L 5/5 , L  42 lbs      Function: NDI 18% loss of function. Assessment:  STG:(to be met in 12 treatments)  1. ? Pain: Neck, upper back, L shoulder 3/10 average pain, 5/10 at worst Partially Met  2. ? ROM:Cerv flex 40°, SB B 20°, B Rot WNL w/out increased pain- MET for all but SB bilateral  3. ? Strength: B Shoulder 4-/5, B elbows 4/5, finger abd L 4-/5, L  30 lbs  MET     4. ? Function: 18% loss of neck function MET    5. Pt report improved tolerance to sitting, standing, and driving to improve tolerance to working MET  6. Patient to be independent with home exercise program as demonstrated by performance with correct form without cues.  MET  LTG  (to be met in 18 Rx, set )  7. ? Strength: B Shoulder /  8. ? Function: NDI 10% loss of neck function  9. Pt report improved tolerance to driving and working        Treatment Plan:  [x] Therapeutic Exercise   33152  [] Iontophoresis: 4 mg/mL Dexamethasone Sodium Phosphate  mAmin  54247   [x] Therapeutic Activity  33830 [] Vasopneumatic cold with compression  45583    [] Gait Training   90468 [x] Ultrasound   [id number]   [] Neuromuscular Re-education  78207 [x] Electrical Stimulation Unattended  14956   [x] Manual Therapy  51759 [] Electrical Stimulation Attended  75302   [x] Instruction in HEP  [] Lumbar/Cervical Traction  23183   [] Aquatic Therapy   62897 [] Cold/hotpack    [x] Massage   99877      [x] Dry Needling, 1 or 2 muscles  20733   [] Biofeedback, first 15 minutes   12430  [] Biofeedback, additional 15 minutes   74542 [x] Dry Needling, 3 or more muscles  63789       Patient Status:     [x] Continue per initial plan of care. [x] Additional visits necessary. [x] Other: Pt at end of current insurance auth, will request additional Rx. Patient would continue to benefit from skilled physical therapy services in order to: decrease pain neck, shoulders, upper back, increase cerv ROM, increase strength B UE , and improve tolerance to sitting, standing, and driving to improve tolerance to working. Requested Frequency/Duration: 2 times per week for 6 treatments. Electronically signed by Breann Walden PT on 6/30/2022 at 8:40 AM        If you have any questions or concerns, please don't hesitate to call. Thank you for your referral.    Physician Signature:________________________________Date:__________________  By signing above or cosigning this note, I have reviewed this plan of care and certify a need for medically necessary rehabilitation services.      *PLEASE SIGN ABOVE AND FAX BACK ALL PAGES*

## 2022-06-30 ENCOUNTER — HOSPITAL ENCOUNTER (OUTPATIENT)
Dept: PHYSICAL THERAPY | Age: 62
Setting detail: THERAPIES SERIES
End: 2022-06-30
Payer: MEDICAID

## 2022-07-18 ENCOUNTER — HOSPITAL ENCOUNTER (OUTPATIENT)
Dept: PHYSICAL THERAPY | Age: 62
Setting detail: THERAPIES SERIES
Discharge: HOME | End: 2022-07-18
Payer: MEDICAID

## 2022-07-18 PROCEDURE — 97110 THERAPEUTIC EXERCISES: CPT

## 2022-07-18 PROCEDURE — 97140 MANUAL THERAPY 1/> REGIONS: CPT

## 2022-07-18 NOTE — FLOWSHEET NOTE
[x] [facility]) [facility] &  Therapy  [address]  [phone]  F: [phone]     Physical Therapy Daily Treatment Note    Date:  2022  Patient Name:  Fahad Juarez    :  1960  MRN: [id number]  Physician: Gregoria Trevino MD; [address] [place] [address]: [facility] updated  Approval was received, 7, for 6 visits, from 22 to 10/3/22. Authorization number [id number]  Approved  There Ex 11698 48 units  There Activ 41741 48 units  Manual 70448 48 uints  US 16097 48 units  Medical Diagnosis: Strain of neck muscle, S16. 1XXA                      Rehab Codes: M54.2, M54.6, M25.512, M25.511, R29.3, M62.511, M62.512   Onset Date: 2022                    Next 's appt. : 3 months   Visit# / total visits:  (corrected on )   Cancels/No Shows: 3/0    Subjective:    Pain:  [x] Yes  [] No Location: L sided cervical spine into back Pain Rating: (0-10 scale) 5/10  Pain altered Tx:  [x] No  [] Yes  Action:  Comments: Patient arrives 10 min late. States her pain is worse, neck is stiff. Has been missing therapy for the past 3 weeks and feels her neck is worse because of it.       Objective:  Modalities: HP to B UT is chair   Precautions:  Exercises:  Exercise Reps/ Time Weight/ Level Comments    UBE 4' L1 Fwd x   Standing        Corner stretch 3x 30sec     Active shoulder ROM, B 10x2 ea  Flexion, abd           Seated       Manual 15 min  MFR and hypervolt to bilateral UT/MT/LT, rhomboids, levator scap seated in chair   x   UT stretch [id number]  B x   Scapular retraction 20x5\"   x   Retro shoulder rolls 20x   x   Cervical ROM 10x ea 3sec Rotation, progressed reps / x   Chin tucks              Foam ball squeeze              Supine       Manual cervical traction -- min  manual traction, w/sub-occ release    Cerv Retractions  20x 5sec Performed in seated    pec stretch        Wand flexion  2 lb     Wand abd  2 lb     Wand chest press 2 lb            Standing        Theraband rows 20x blue  x   Tband triceps  2x10 blue Added 6/16 x   Theraband shoulder ext 20x blue     L IR, ER 2x10 blue For ER: 1 set lime, 1 set blue     Other:      Treatment Charges: Mins Units   [x]  Modalities HP 10 0   [x]  Ther Exercise 28 2   [x]  Manual Therapy 15 1   []  Ther Activities     []  Aquatics     []  Vasocompression     []  Other     Total Treatment time 43 3   --warm up not billable time    Assessment: [] Progressing toward goals. [x] No change. Patient arrives 10 min late. Required mod cueing with thera band for activation of rhomboids with little to no carry over. Hyper volt performed per patient's request to decrease muscle guarding. Seated scapular training exercises completed prior to trial of HP to B UT.       [] Other:     [x] Patient would continue to benefit from skilled physical therapy services in order to: decrease pain neck, shoulders, upper back, increase cerv ROM, increase strength B UE , and improve tolerance to sitting, standing, and driving to improve tolerance to working. STG: (to be met in 12 treatments)  ? Pain: Neck, upper back, L shoulder 3/10 average pain, 5/10 at worst Progress avg 3/10, 7/10 at worst when she really over does it.   ? ROM:Cerv flex 40°, SB B 20°, B Rot WNL w/out increased pain flex 42 MET, SB 15 bilateral NOT MET, R rot 55 deg, L rot 50 deg  ? Strength: B Shoulder 4-/5, B elbows 4/5, finger abd L 4-/5, L  30 lbs B shoulder MET, B elbows 5/5  MET, Finger abd L 5/5 Met, L  MET 42 lbs   ? Function: 18% loss of neck function MET 18%   Pt report improved tolerance to sitting, standing, and driving to improve tolerance to working MET  Patient to be independent with home exercise program as demonstrated by performance with correct form without cues. MET      Patient goals: \"no back strain or burning after sitting or standing\"    Pt.  Education:  [x] Yes  [] No  [x] Reviewed Prior HEP/Ed  Method of Education: [x] Verbal  [x] Demo  [] Written   Comprehension of Education:  [x] Verbalizes understanding-supine cerv retractions and upper trap stretch for HEP. [x] Demonstrates understanding. [] Needs review. [x] Demonstrates/verbalizes HEP/Ed previously given. 5/16 HEP-Access Code: [email or web address]  URL: "[employer]/  Prepared by: Lesa Ennis  Seated Upper Trapezius Stretch - 1 x daily - 7 x weekly - 1 sets - 3 reps - 15 hold  Seated Cervical Rotation AROM - 1 x daily - 7 x weekly - 3 sets - 5 reps - 5 hold  Seated Scapular Retraction - 1 x daily - 7 x weekly - 2 sets - 10 reps - 5 hold  Seated Cervical Retraction - 1 x daily - 7 x weekly - 2 sets - 10 reps - 5 hold  Supine Shoulder Flexion Extension AAROM with Dowel - 1 x daily - 7 x weekly - 2 sets - 10 reps - 5 hold  Supine Shoulder Abduction AAROM with Dowel - 1 x daily - 7 x weekly - 2 sets - 10 reps - 5 hold    5/31 HEP -Access Code: [email or web address]  URL: "[employer]/  Shoulder extension with resistance - Neutral - 2 x daily - 7 x weekly - 30 reps  Standing Shoulder Row with Anchored Resistance - 2 x daily - 7 x weekly - 30 reps      Plan: [x] Continue current frequency toward long and short term goals.     [x] Specific Instructions for subsequent treatments: postural ed, progress neck, shoulder ex, ok for HP ES to decrease pain, tightness; update HEP soon      Time In: 0910         Time Out: 1003    Electronically signed by:  Delicia Pratt PTA

## 2022-07-22 ENCOUNTER — APPOINTMENT (OUTPATIENT)
Dept: PHYSICAL THERAPY | Age: 62
End: 2022-07-22
Payer: MEDICAID

## 2022-07-25 ENCOUNTER — HOSPITAL ENCOUNTER (OUTPATIENT)
Dept: PHYSICAL THERAPY | Age: 62
Setting detail: THERAPIES SERIES
Discharge: HOME | End: 2022-07-25
Payer: MEDICAID

## 2022-07-25 PROCEDURE — 97110 THERAPEUTIC EXERCISES: CPT

## 2022-07-25 PROCEDURE — 97140 MANUAL THERAPY 1/> REGIONS: CPT

## 2022-07-25 NOTE — FLOWSHEET NOTE
[x] [facility]) [facility] &  Therapy  [address]  [phone]  F: [phone]     Physical Therapy Daily Treatment Note    Date:  2022  Patient Name:  Riana Engel    :  1960  MRN: [id number]  Physician: Shannon Meza MD; 5 [address]: [facility] updated  Approval was received, 22, for 6 visits, from 22 to 10/3/22. Authorization number [id number]  Approved  There Ex 95284 48 units  There Activ 58991 48 units  Manual 93157 48 uints  US 85031 48 units  Medical Diagnosis: Strain of neck muscle, S16. 1XXA                      Rehab Codes: M54.2, M54.6, M25.512, M25.511, R29.3, M62.511, M62.512   Onset Date: 2022                    Next 's appt. : 3 months   Visit# / total visits:  (corrected on )   Cancels/No Shows: 3/0    Subjective:    Pain:  [x] Yes  [] No Location: L sided cervical spine into back Pain Rating: (0-10 scale) 3/10  Pain altered Tx:  [x] No  [] Yes  Action:  Comments: Pt reported that she is feeling much better since last visit stating that everything feels loosened up.      Objective:  Modalities: HP to B UT is chair   Precautions:  Exercises:  Exercise Reps/ Time Weight/ Level Comments    UBE 4' L1 Fwd x   Standing        Corner stretch 3x 30sec  x   Active shoulder ROM, B 10x2 ea 1 lb Flexion, abd, scaption (10x) x          Seated       Manual 15 min  MFR and hypervolt to bilateral UT/MT/LT, rhomboids, levator scap seated in chair   x   UT stretch [id number]  B x   Scapular retraction 20x5\"   x   Retro shoulder rolls 20x   x   Cervical ROM 10x ea 3sec Rotation, progressed reps /16 x   Chin tucks              Foam ball squeeze              Supine       Manual cervical traction -- min  manual traction, w/sub-occ release    Cerv Retractions  20x 5sec Performed in seated    pec stretch        Wand flexion  2 lb     Wand abd  2 lb     Wand chest press  2 lb            Standing Theraband rows 20x blue  x   Tband triceps  2x10 blue Added 6/16    Theraband shoulder ext 20x blue  x   L IR, ER 2x10 blue  x   Other:      Treatment Charges: Mins Units   [x]  Modalities HP     [x]  Ther Exercise 38 3   [x]  Manual Therapy 15 1   []  Ther Activities     []  Aquatics     []  Vasocompression     []  Other     Total Treatment time 53 4   --warm up not billable time    Assessment: [x] Progressing toward goals. Pt with good tolerance to all activities. Added scaption to standing AROM to improve shoulder strength. Added dumb bells to active shoulder ROM to improve strength. Pt received manual via hypervolt to improve muscle tension and decrease muscle guarding. [] No change. [] Other:     [x] Patient would continue to benefit from skilled physical therapy services in order to: decrease pain neck, shoulders, upper back, increase cerv ROM, increase strength B UE , and improve tolerance to sitting, standing, and driving to improve tolerance to working. STG: (to be met in 12 treatments)  ? Pain: Neck, upper back, L shoulder 3/10 average pain, 5/10 at worst Progress avg 3/10, 7/10 at worst when she really over does it.   ? ROM:Cerv flex 40°, SB B 20°, B Rot WNL w/out increased pain flex 42 MET, SB 15 bilateral NOT MET, R rot 55 deg, L rot 50 deg  ? Strength: B Shoulder 4-/5, B elbows 4/5, finger abd L 4-/5, L  30 lbs B shoulder MET, B elbows 5/5  MET, Finger abd L 5/5 Met, L  MET 42 lbs   ? Function: 18% loss of neck function MET 18%   Pt report improved tolerance to sitting, standing, and driving to improve tolerance to working MET  Patient to be independent with home exercise program as demonstrated by performance with correct form without cues. MET      Patient goals: \"no back strain or burning after sitting or standing\"    Pt.  Education:  [x] Yes  [] No  [x] Reviewed Prior HEP/Ed  Method of Education: [x] Verbal  [x] Demo  [] Written   Comprehension of Education:  [x] Verbalizes understanding-supine cerv retractions and upper trap stretch for HEP. [x] Demonstrates understanding. [] Needs review. [x] Demonstrates/verbalizes HEP/Ed previously given. 5/16 HEP-Access Code: [email or web address]  URL: [email or web address]/  Prepared by: Lai Romero  Seated Upper Trapezius Stretch - 1 x daily - 7 x weekly - 1 sets - 3 reps - 15 hold  Seated Cervical Rotation AROM - 1 x daily - 7 x weekly - 3 sets - 5 reps - 5 hold  Seated Scapular Retraction - 1 x daily - 7 x weekly - 2 sets - 10 reps - 5 hold  Seated Cervical Retraction - 1 x daily - 7 x weekly - 2 sets - 10 reps - 5 hold  Supine Shoulder Flexion Extension AAROM with Dowel - 1 x daily - 7 x weekly - 2 sets - 10 reps - 5 hold  Supine Shoulder Abduction AAROM with Dowel - 1 x daily - 7 x weekly - 2 sets - 10 reps - 5 hold    5/31 HEP -Access Code: [email or web address]  URL: [employer]/  Shoulder extension with resistance - Neutral - 2 x daily - 7 x weekly - 30 reps  Standing Shoulder Row with Anchored Resistance - 2 x daily - 7 x weekly - 30 reps      Plan: [x] Continue current frequency toward long and short term goals.     [x] Specific Instructions for subsequent treatments: postural ed, progress neck, shoulder ex, ok for HP ES to decrease pain, tightness; update HEP soon      Time In: 0804         Time Out: 0901    Electronically signed by:  Zari Betancourt PTA

## 2022-08-01 ENCOUNTER — HOSPITAL ENCOUNTER (OUTPATIENT)
Dept: PHYSICAL THERAPY | Age: 62
Setting detail: THERAPIES SERIES
Discharge: HOME | End: 2022-08-01
Payer: MEDICAID

## 2022-08-01 PROCEDURE — 97110 THERAPEUTIC EXERCISES: CPT

## 2022-08-01 NOTE — FLOWSHEET NOTE
[x] [facility] &  Therapy  [address]  [phone]  F: [phone]     Physical Therapy Daily Treatment Note    Date:  2022  Patient Name:  Leah Beckwith    :  1960  MRN: [id number]  Physician: Hetal Gallagher MD; [address]: [facility] updated  Approval was received, 7, for 6 visits, from 22 to 10/3/22. Authorization number [id number]  Approved  There Ex 25103 48 units  There Activ [id number] 48 units  Manual 93463 48 uints  US 83794 48 units  Medical Diagnosis: Strain of neck muscle, S16. 1XXA                      Rehab Codes: M54.2, M54.6, M25.512, M25.511, R29.3, M62.511, M62.512   Onset Date: 2022                    Next 's appt. : 3 months   Visit# / total visits: 15/18 (corrected on )   Cancels/No Shows: 3/1    Subjective:    Pain:  [x] Yes  [] No Location: L sided cervical spine into back Pain Rating: (0-10 scale) 3/10  Pain altered Tx:  [x] No  [] Yes  Action:  Comments: Pt reported she feels tight on both sides of her neck and shoulders due to increased activity over the weekend.      Objective:  Modalities: HP to B UT is chair- held 22   Precautions:  Exercises:  Exercise Reps/ Time Weight/ Level Comments    UBE 4' L1 Fwd x          Standing        Corner stretch 3x 30sec  x   Active shoulder ROM, B 10x2 ea 1 lb Flexion, abd, scaption (10x) x          Seated       Manual 15 min  MFR and hypervolt to bilateral UT/MT/LT, rhomboids, levator scap seated in chair- 5 min 22 due to pt time constraint   x   UT stretch [id number]  B x   Scapular retraction 20x5\"   x   Retro shoulder rolls 20x   x   Cervical ROM 10x ea 3sec Rotation, progressed reps  x   Chin tucks              Foam ball squeeze   Reviewed at pt request for HEP           Supine       Manual cervical traction -- min  manual traction, w/sub-occ release    Cerv Retractions  20x 5sec Performed in seated    pec stretch Wand flexion  2 lb     Wand abd  2 lb     Wand chest press  2 lb            Standing        Theraband rows 20x blue  x   Tband triceps  2x10 blue Added 6/16 x   Theraband shoulder ext 20x blue  x   L IR, ER 2x10 blue  x   Other:      Treatment Charges: Mins Units   [x]  Modalities HP     [x]  Ther Exercise 42 3   [x]  Manual Therapy 5 0   []  Ther Activities     []  Aquatics     []  Vasocompression     []  Other     Total Treatment time 47 3   --warm up not billable time    Assessment: [x] Progressing toward goals. Pt initiated treatment with UBE to promote ROM and endurance. Pt continued with standing activities to improve UE strength. Reviewed multiple activities at pt request for HEP. Pt concluded session with 5 minutes of manual due to time constraint set by pt.     [] No change. [] Other:     [x] Patient would continue to benefit from skilled physical therapy services in order to: decrease pain neck, shoulders, upper back, increase cerv ROM, increase strength B UE , and improve tolerance to sitting, standing, and driving to improve tolerance to working. STG: (to be met in 12 treatments)  ? Pain: Neck, upper back, L shoulder 3/10 average pain, 5/10 at worst Progress avg 3/10, 7/10 at worst when she really over does it.   ? ROM:Cerv flex 40°, SB B 20°, B Rot WNL w/out increased pain flex 42 MET, SB 15 bilateral NOT MET, R rot 55 deg, L rot 50 deg  ? Strength: B Shoulder 4-/5, B elbows 4/5, finger abd L 4-/5, L  30 lbs B shoulder MET, B elbows 5/5  MET, Finger abd L 5/5 Met, L  MET 42 lbs   ? Function: 18% loss of neck function MET 18%   Pt report improved tolerance to sitting, standing, and driving to improve tolerance to working MET  Patient to be independent with home exercise program as demonstrated by performance with correct form without cues. MET      Patient goals: \"no back strain or burning after sitting or standing\"    Pt.  Education:  [x] Yes  [] No  [x] Reviewed Prior HEP/Ed  Method of Education: [x] Verbal  [x] Demo  [] Written   Comprehension of Education:  [x] Verbalizes understanding-supine cerv retractions and upper trap stretch for HEP. [x] Demonstrates understanding. [] Needs review. [x] Demonstrates/verbalizes HEP/Ed previously given. 5/16 HEP-Access Code: [email or web address]  URL: [employer]/  Prepared by: Good Flowers  Seated Upper Trapezius Stretch - 1 x daily - 7 x weekly - 1 sets - 3 reps - 15 hold  Seated Cervical Rotation AROM - 1 x daily - 7 x weekly - 3 sets - 5 reps - 5 hold  Seated Scapular Retraction - 1 x daily - 7 x weekly - 2 sets - 10 reps - 5 hold  Seated Cervical Retraction - 1 x daily - 7 x weekly - 2 sets - 10 reps - 5 hold  Supine Shoulder Flexion Extension AAROM with Dowel - 1 x daily - 7 x weekly - 2 sets - 10 reps - 5 hold  Supine Shoulder Abduction AAROM with Dowel - 1 x daily - 7 x weekly - 2 sets - 10 reps - 5 hold    5/31 HEP -Access Code: [email or web address]  URL: [employer]/  Shoulder extension with resistance - Neutral - 2 x daily - 7 x weekly - 30 reps  Standing Shoulder Row with Anchored Resistance - 2 x daily - 7 x weekly - 30 reps      Plan: [x] Continue current frequency toward long and short term goals.     [x] Specific Instructions for subsequent treatments: postural ed, progress neck, shoulder ex, ok for HP ES to decrease pain, tightness; update HEP soon      Time In: 0910         Time Out: 1001    Electronically signed by:  Devonte Manrique PTA

## 2022-08-04 ENCOUNTER — HOSPITAL ENCOUNTER (OUTPATIENT)
Dept: PHYSICAL THERAPY | Age: 62
Setting detail: THERAPIES SERIES
Discharge: HOME | End: 2022-08-04
Payer: MEDICAID

## 2022-08-04 PROCEDURE — 97140 MANUAL THERAPY 1/> REGIONS: CPT

## 2022-08-04 PROCEDURE — 97110 THERAPEUTIC EXERCISES: CPT

## 2022-08-04 NOTE — FLOWSHEET NOTE
[x] [facility]  [address]  [phone]  F: [phone]     Physical Therapy Daily Treatment Note    Date:  2022  Patient Name:  Bev Queen    :  1960  MRN: [id number]  Physician: Karla Dalal MD; [address]: [facility] updated  Approval was received, 7, for 6 visits, from 22 to 10/3/22. Authorization number [id number]  Approved  There Ex 18876 48 units  There Activ 55304 48 units  Manual 90592 48 uints   59281 48 units  Medical Diagnosis: Strain of neck muscle, S16. 1XXA                      Rehab Codes: M54.2, M54.6, M25.512, M25.511, R29.3, M62.511, M62.512   Onset Date: 2022                    Next 's appt. : 3 months   Visit# / total visits:  (corrected on )   Cancels/No Shows: 3/1    Subjective:    Pain:  [x] Yes  [] No Location: R side of neck Pain Rating: (0-10 scale) 4-5/10  Pain altered Tx:  [x] No  [] Yes  Action:  Comments: Patient arrives with R sided neck pain. States that is a nagging type of pain. Patient states that she feels that her left UE continues to be weaker in her  and it has been neglected with therapy. Patient was given hand/ exercises at her last treatment that she states she will continue to use.      Objective:  Modalities: HP to B UT is chair- held 22   Precautions:  Exercises:  Exercise Reps/ Time Weight/ Level Comments    UBE 4' L1 Fwd x          Standing        Corner stretch 3x 30sec  x   Active shoulder ROM, B 10x2 ea 1 lb Flexion, abd, scaption (10x) x          Seated       Manual 15 min  MFR and hypervolt to bilateral UT/MT/LT, rhomboids, levator scap seated in chair x   UT stretch [id number]  B x   Scapular retraction 20x5\"   x   Retro shoulder rolls 20x   x   Cervical ROM 10x ea 3sec Rotation, progressed reps  x   Chin tucks              Foam ball squeeze   Reviewed at pt request for HEP           Supine Manual cervical traction -- min  manual traction, w/sub-occ release    Cerv Retractions  20x 5sec Performed in seated    pec stretch        Wand flexion  2 lb     Wand abd  2 lb     Wand chest press  2 lb     Serratus punches 2x10 1lb Added 8/4 x   Cervical mobs 10x ea 1.1 lb ball Ball behind neck in supine, added 8/4 x          Standing        Theraband rows 20x blue     Tband triceps  2x10 blue Added 6/16    Theraband shoulder ext 20x blue     L IR, ER 2x10 blue     Other:      Treatment Charges: Mins Units   []  Modalities HP     [x]  Ther Exercise 30 2   [x]  Manual Therapy 15 1   []  Ther Activities     []  Aquatics     []  Vasocompression     []  Other 45 3   Total Treatment time     --warm up not billable time    Assessment: [] Progressing toward goals. [x] No change: Patient arrives 7 minutes late on this date. Educated patient on course of therapy and explained  strength numbers from initial eval to follow up with improvement in L UE  strength. Continued with banded exercises with patient reporting \"spasm\" in the R side of her neck. Followed with 15 minutes of manual as listed above with patient reporting improving of symptoms. Continued with standing strengthening exercises with proper cues for form. Completed standing flex/abd with back against the wall in order to reduce compensatory motion. Did not complete theraband exercises on this date due to increasing neck tension on this date. Added supine serratus punches and cervical mobs in supine with patient reporting \"feeling good. \"     [] Other:     [x] Patient would continue to benefit from skilled physical therapy services in order to: decrease pain neck, shoulders, upper back, increase cerv ROM, increase strength B UE , and improve tolerance to sitting, standing, and driving to improve tolerance to working. STG: (to be met in 12 treatments)  ?  Pain: Neck, upper back, L shoulder 3/10 average pain, 5/10 at worst Progress avg 3/10, 7/10 at worst when she really over does it.   ? ROM:Cerv flex 40°, SB B 20°, B Rot WNL w/out increased pain flex 42 MET, SB 15 bilateral NOT MET, R rot 55 deg, L rot 50 deg  ? Strength: B Shoulder 4-/5, B elbows 4/5, finger abd L 4-/5, L  30 lbs B shoulder MET, B elbows 5/5  MET, Finger abd L 5/5 Met, L  MET 42 lbs   ? Function: 18% loss of neck function MET 18%   Pt report improved tolerance to sitting, standing, and driving to improve tolerance to working MET  Patient to be independent with home exercise program as demonstrated by performance with correct form without cues. MET      Patient goals: \"no back strain or burning after sitting or standing\"    Pt. Education:  [x] Yes  [] No  [x] Reviewed Prior HEP/Ed  Method of Education: [x] Verbal  [x] Demo  [] Written   Comprehension of Education:  [x] Verbalizes understanding-supine cerv retractions and upper trap stretch for HEP. [x] Demonstrates understanding. [] Needs review. [x] Demonstrates/verbalizes HEP/Ed previously given. 5/16 HEP-Access Code: [email or web address]  URL: [employer]/  Prepared by: Lesa Ennis  Seated Upper Trapezius Stretch - 1 x daily - 7 x weekly - 1 sets - 3 reps - 15 hold  Seated Cervical Rotation AROM - 1 x daily - 7 x weekly - 3 sets - 5 reps - 5 hold  Seated Scapular Retraction - 1 x daily - 7 x weekly - 2 sets - 10 reps - 5 hold  Seated Cervical Retraction - 1 x daily - 7 x weekly - 2 sets - 10 reps - 5 hold  Supine Shoulder Flexion Extension AAROM with Dowel - 1 x daily - 7 x weekly - 2 sets - 10 reps - 5 hold  Supine Shoulder Abduction AAROM with Dowel - 1 x daily - 7 x weekly - 2 sets - 10 reps - 5 hold    5/31 HEP -Access Code: [email or web address]  URL: [employer]/  Shoulder extension with resistance - Neutral - 2 x daily - 7 x weekly - 30 reps  Standing Shoulder Row with Anchored Resistance - 2 x daily - 7 x weekly - 30 reps      Plan: [x] Continue current frequency toward long and short term goals. [x] Specific Instructions for subsequent treatments: postural ed, progress neck, shoulder ex, ok for HP ES to decrease pain, tightness; update HEP soon      Time In: 1007         Time Out: 1051    Electronically signed by:  Silvia Mendiola PT

## 2022-08-08 ENCOUNTER — HOSPITAL ENCOUNTER (OUTPATIENT)
Dept: PHYSICAL THERAPY | Age: 62
Setting detail: THERAPIES SERIES
Discharge: HOME | End: 2022-08-08
Payer: MEDICAID

## 2022-08-08 ENCOUNTER — APPOINTMENT (OUTPATIENT)
Dept: PHYSICAL THERAPY | Age: 62
End: 2022-08-08
Payer: MEDICAID

## 2022-08-08 NOTE — FLOWSHEET NOTE
[x] [facility]  [address]    [phone]  F: [phone]     Physical Therapy Cancel/No Show note    Date: 2022  Patient: Malika Marcano  : 1960  MRN: [id number]    Cancels/No Shows to date:     For today's appointment patient:    [x]  Cancelled    [] Rescheduled appointment    [] No-show     Reason given by patient:    []  Patient ill    []  Conflicting appointment    [] No transportation      [] Conflict with work    [] No reason given:     [x] Weather related    [] COVID-19    [x] Other:      Comments:  Pt called to state she was going to be 15 minutes late and left a voicemail when called to confirm pt decided to cancel. Confirmed next appt.        [x] Next appointment was confirmed     Electronically signed by: David Pratt PTA

## 2022-08-15 ENCOUNTER — HOSPITAL ENCOUNTER (OUTPATIENT)
Dept: PHYSICAL THERAPY | Age: 62
Setting detail: THERAPIES SERIES
Discharge: HOME | End: 2022-08-15
Payer: MEDICAID

## 2022-08-15 PROCEDURE — 97140 MANUAL THERAPY 1/> REGIONS: CPT

## 2022-08-15 PROCEDURE — 97110 THERAPEUTIC EXERCISES: CPT

## 2022-08-15 NOTE — FLOWSHEET NOTE
[x] [facility] &  Therapy  [address]  [phone]  F: [phone]     Physical Therapy Daily Treatment Note    Date:  8/15/2022  Patient Name:  Mumtaz Raya    :  1960  MRN: [id number]  Physician: Gita Echavarria MD; [address] [place] [address]: [facility] updated  Approval was received, 7, for 6 visits, from 22 to 10/3/22. Authorization number [id number]  Approved  There Ex 07283 48 units  There Activ 58291 48 units  Manual 19647 48 uints  US 36698 48 units  Medical Diagnosis: Strain of neck muscle, S16. 1XXA                      Rehab Codes: M54.2, M54.6, M25.512, M25.511, R29.3, M62.511, M62.512   Onset Date: 2022                    Next 's appt. : 3 months   Visit# / total visits:  (corrected on )   Cancels/No Shows: 3/1    Subjective:    Pain:  [x] Yes  [] No Location: R side of neck Pain Rating: (0-10 scale) 2/10  Pain altered Tx:  [x] No  [] Yes  Action:  Comments: Patient reported a significant decrease in pain. Pt stated that she has been compliant with HEP.      Objective:  Modalities: HP to B UT is chair- held 22   Precautions:  Exercises:  Exercise Reps/ Time Weight/ Level Comments    UBE 4' L1 Fwd x          Standing        Corner stretch 3x 30sec  x   Active shoulder ROM, B 10x2 ea 1 lb Flexion, abd, scaption (10x) x          Seated       Manual 15 min  MFR and hypervolt to bilateral UT/MT/LT, rhomboids, levator scap seated in chair x   UT stretch [id number]  B x   Scapular retraction 20x5\"   x   Retro shoulder rolls 20x   x   Cervical ROM 10x ea 3sec Rotation, progressed reps  x   Chin tucks              Foam ball squeeze   Reviewed at pt request for HEP           Supine       Manual cervical traction -- min  manual traction, w/sub-occ release    Cerv Retractions  20x 5sec Performed in seated    pec stretch        Wand flexion  2 lb     Wand abd  2 lb     Wand chest press  2 lb Serratus punches 2x10 1lb Added 8/4    Cervical mobs 10x ea 1.1 lb ball Ball behind neck in supine, added 8/4           Standing        Theraband rows 20x blue  x   Tband triceps  2x10 blue Added 6/16 x   Theraband shoulder ext 20x blue  x   L IR, ER 2x10 blue  x   Other:      Treatment Charges: Mins Units   []  Modalities HP     [x]  Ther Exercise 34 2   [x]  Manual Therapy 15 1   []  Ther Activities     []  Aquatics     []  Vasocompression     []  Other     Total Treatment time 49 3   --warm up not billable time    Assessment: [x] Progressing toward goals. Pt with good tolerance to all activities noting no increase in pain. Focused on technique and reviewing activities for HEP as pt wants to be discharged. Pt received manual post treatment to decrease muscle tension. [] No change:     [] Other:     [x] Patient would continue to benefit from skilled physical therapy services in order to: decrease pain neck, shoulders, upper back, increase cerv ROM, increase strength B UE , and improve tolerance to sitting, standing, and driving to improve tolerance to working. STG: (to be met in 12 treatments)  ? Pain: Neck, upper back, L shoulder 3/10 average pain, 5/10 at worst Progress avg 3/10, 7/10 at worst when she really over does it.   ? ROM:Cerv flex 40°, SB B 20°, B Rot WNL w/out increased pain flex 42 MET, SB 15 bilateral NOT MET, R rot 55 deg, L rot 50 deg  ? Strength: B Shoulder 4-/5, B elbows 4/5, finger abd L 4-/5, L  30 lbs B shoulder MET, B elbows 5/5  MET, Finger abd L 5/5 Met, L  MET 42 lbs   ? Function: 18% loss of neck function MET 18%   Pt report improved tolerance to sitting, standing, and driving to improve tolerance to working MET  Patient to be independent with home exercise program as demonstrated by performance with correct form without cues. MET      Patient goals: \"no back strain or burning after sitting or standing\"    Pt.  Education:  [x] Yes  [] No  [x] Reviewed Prior HEP/Ed  Method of Education: [x] Verbal  [x] Demo  [] Written   Comprehension of Education:  [x] Verbalizes understanding-supine cerv retractions and upper trap stretch for HEP. [x] Demonstrates understanding. [] Needs review. [x] Demonstrates/verbalizes HEP/Ed previously given. 5/16 HEP-Access Code: [email or web address]  URL: [employer]/  Prepared by: Luzma Borges  Seated Upper Trapezius Stretch - 1 x daily - 7 x weekly - 1 sets - 3 reps - 15 hold  Seated Cervical Rotation AROM - 1 x daily - 7 x weekly - 3 sets - 5 reps - 5 hold  Seated Scapular Retraction - 1 x daily - 7 x weekly - 2 sets - 10 reps - 5 hold  Seated Cervical Retraction - 1 x daily - 7 x weekly - 2 sets - 10 reps - 5 hold  Supine Shoulder Flexion Extension AAROM with Dowel - 1 x daily - 7 x weekly - 2 sets - 10 reps - 5 hold  Supine Shoulder Abduction AAROM with Dowel - 1 x daily - 7 x weekly - 2 sets - 10 reps - 5 hold    5/31 HEP -Access Code: [email or web address]  URL: [employer]/  Shoulder extension with resistance - Neutral - 2 x daily - 7 x weekly - 30 reps  Standing Shoulder Row with Anchored Resistance - 2 x daily - 7 x weekly - 30 reps      Plan: [] Continue current frequency toward long and short term goals. [x] Specific Instructions for subsequent treatments: Pt wishes to be discharged to [facility].        Time In: 0910         Time Out: 1003    Electronically signed by:  Benoit Cowart PTA

## 2022-09-21 DIAGNOSIS — I10 ESSENTIAL HYPERTENSION: ICD-10-CM

## 2022-09-22 ENCOUNTER — TELEMEDICINE (OUTPATIENT)
Dept: FAMILY MEDICINE CLINIC | Age: 62
End: 2022-09-22
Payer: MEDICAID

## 2022-09-22 DIAGNOSIS — Z12.31 ENCOUNTER FOR SCREENING MAMMOGRAM FOR BREAST CANCER: ICD-10-CM

## 2022-09-22 DIAGNOSIS — I10 ESSENTIAL HYPERTENSION: Primary | ICD-10-CM

## 2022-09-22 PROCEDURE — 99214 OFFICE O/P EST MOD 30 MIN: CPT | Performed by: STUDENT IN AN ORGANIZED HEALTH CARE EDUCATION/TRAINING PROGRAM

## 2022-09-22 RX ORDER — HYDROCHLOROTHIAZIDE 25 MG/1
TABLET ORAL
Qty: 90 TABLET | Refills: 1 | Status: SHIPPED | OUTPATIENT
Start: 2022-09-22

## 2022-09-22 RX ORDER — HYDROCHLOROTHIAZIDE 25 MG/1
TABLET ORAL
Qty: 90 TABLET | Refills: 0 | Status: SHIPPED | OUTPATIENT
Start: 2022-09-22 | End: 2022-09-22 | Stop reason: SDUPTHER

## 2022-09-22 SDOH — ECONOMIC STABILITY: FOOD INSECURITY: WITHIN THE PAST 12 MONTHS, THE FOOD YOU BOUGHT JUST DIDN'T LAST AND YOU DIDN'T HAVE MONEY TO GET MORE.: SOMETIMES TRUE

## 2022-09-22 SDOH — ECONOMIC STABILITY: FOOD INSECURITY: WITHIN THE PAST 12 MONTHS, YOU WORRIED THAT YOUR FOOD WOULD RUN OUT BEFORE YOU GOT MONEY TO BUY MORE.: SOMETIMES TRUE

## 2022-09-22 ASSESSMENT — PATIENT HEALTH QUESTIONNAIRE - PHQ9
SUM OF ALL RESPONSES TO PHQ QUESTIONS 1-9: 0
SUM OF ALL RESPONSES TO PHQ QUESTIONS 1-9: 0
2. FEELING DOWN, DEPRESSED OR HOPELESS: 0
1. LITTLE INTEREST OR PLEASURE IN DOING THINGS: 0
SUM OF ALL RESPONSES TO PHQ9 QUESTIONS 1 & 2: 0
SUM OF ALL RESPONSES TO PHQ QUESTIONS 1-9: 0
SUM OF ALL RESPONSES TO PHQ QUESTIONS 1-9: 0

## 2022-09-22 ASSESSMENT — SOCIAL DETERMINANTS OF HEALTH (SDOH): HOW HARD IS IT FOR YOU TO PAY FOR THE VERY BASICS LIKE FOOD, HOUSING, MEDICAL CARE, AND HEATING?: VERY HARD
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
## 2022-09-22 NOTE — PROGRESS NOTES
HYPERTENSION visit     BP Readings from Last 3 Encounters:   04/22/22 (!) 147/86   04/19/22 138/89   01/27/22 139/80       LDL Cholesterol (mg/dL)   Date Value   01/27/2022 147 (H)     HDL (mg/dL)   Date Value   01/27/2022 112     BUN (mg/dL)   Date Value   01/27/2022 18     Creatinine (mg/dL)   Date Value   01/27/2022 1.04 (H)     Glucose (mg/dL)   Date Value   01/27/2022 90              Have you changed or started any medications since your last visit including any over-the-counter medicines, vitamins, or herbal medicines? no   Have you stopped taking any of your medications? Is so, why? -  yes - see list   Are you having any side effects from any of your medications? - no  How often do you miss doses of your medication? occasional      Have you seen any other physician or provider since your last visit?  no   Have you had any other diagnostic tests since your last visit?  no   Have you been seen in the emergency room and/or had an admission in a hospital since we last saw you?  no   Have you had your routine dental cleaning in the past 6 months?  no     Do you have an active MyChart account? If no, what is the barrier?   Yes    Patient Care Team:  Mary Early MD as PCP - General (Emergency Medicine)  Syd Ramsay MD as Consulting Physician (Endocrinology)    Medical History Review  Past Medical, Family, and Social History reviewed and does not contribute to the patient presenting condition    Health Maintenance   Topic Date Due    COVID-19 Vaccine (1) Never done    Hepatitis C screen  Never done    Colorectal Cancer Screen  Never done    Shingles vaccine (1 of 2) Never done    Breast cancer screen  07/07/2017    Flu vaccine (1) Never done    DTaP/Tdap/Td vaccine (1 - Tdap) 01/27/2023 (Originally 12/30/1979)    Lipids  01/27/2023    Depression Screen  01/27/2023    HIV screen  Completed    Hepatitis A vaccine  Aged Out    Hepatitis B vaccine  Aged Out    Hib vaccine  Aged Out    Meningococcal (ACWY) vaccine Aged Out    Pneumococcal 0-64 years Vaccine  Aged Out

## 2022-09-22 NOTE — TELEPHONE ENCOUNTER
Last visit: 4/19/22  Last Med refill: 1/2022  Does patient have enough medication for 72 hours: No:     Next Visit Date:  No future appointments.     Health Maintenance   Topic Date Due    COVID-19 Vaccine (1) Never done    Hepatitis C screen  Never done    Colorectal Cancer Screen  Never done    Shingles vaccine (1 of 2) Never done    Breast cancer screen  07/07/2017    Flu vaccine (1) Never done    DTaP/Tdap/Td vaccine (1 - Tdap) 01/27/2023 (Originally 12/30/1979)    Lipids  01/27/2023    Depression Screen  01/27/2023    HIV screen  Completed    Hepatitis A vaccine  Aged Out    Hepatitis B vaccine  Aged Out    Hib vaccine  Aged Out    Meningococcal (ACWY) vaccine  Aged Out    Pneumococcal 0-64 years Vaccine  Aged Out       Hemoglobin A1C (%)   Date Value   06/11/2018 4.6   07/07/2015 5.0   01/04/2014 4.7             ( goal A1C is < 7)   No results found for: LABMICR  LDL Cholesterol (mg/dL)   Date Value   01/27/2022 147 (H)   07/07/2015 109       (goal LDL is <100)   AST (U/L)   Date Value   06/11/2018 16     ALT (U/L)   Date Value   06/11/2018 16     BUN (mg/dL)   Date Value   01/27/2022 18     BP Readings from Last 3 Encounters:   04/22/22 (!) 147/86   04/19/22 138/89   01/27/22 139/80          (goal 120/80)    All Future Testing planned in [facility]  Lab Frequency Next Occurrence   [facility] DIGITAL SCREEN W OR WO CAD BILATERAL Once 03/27/2023               Patient Active Problem List:     Smoking     Hypertension     Uterine fibroid     Menorrhagia

## 2022-09-22 NOTE — PROGRESS NOTES
Belle Duncan is a 64 y.o. female evaluated via telephone on 9/22/2022 for Hypertension  . Documentation:  I communicated with the patient and/or health care decision maker about     Refill for HCTZ    . Details of this discussion including any medical advice provided:     Refilled medication  Discussed her lipid panel from 01/2022  She was started on Lipi 20 mg daily but she never took it because she was scared of the side effects  We both mutually agreed to check her lipids again in 01/2023 and if her LDL and total cholesterol still elevated we will restart her on statin  Until then she will make an effort with dietary modifications    Total Time: minutes: 21-30 minutes    Belle Duncan was evaluated through a synchronous (real-time) audio encounter. Patient identification was verified at the start of the visit. She (or guardian if applicable) is aware that this is a billable service, which includes applicable co-pays. This visit was conducted with the patient's (and/or legal guardian's) verbal consent. She has not had a related appointment within my department in the past 7 days or scheduled within the next 24 hours. The patient was located at Home: Lien Leal [address] [place] [address]. The provider was located at [place] [address] (Appt Dept): [address] [place] [address]     Note: not billable if this call serves to triage the patient into an appointment for the relevant concern    Barbara Polk MD

## 2022-10-04 NOTE — THERAPY DISCHARGE
[x] [address] [facility]  Therapy  [address]  [phone]  F: [phone]         Physical Therapy Discharge Note    Date: 2022      Patient: Storm Griffith  : 1960  MRN: [id number]    Physician: Anu Garner MD; [address]: [facility] Approval 18 total Rx until 10/3/22. Authorization number [id number]  Medical Diagnosis: Strain of neck muscle, S16. 1XXA                      Rehab Codes: M54.2, M54.6, M25.512, M25.511, R29.3, M62.511, M62.512   Onset Date: 2022                    Next 's appt. : 3 months           Total visits attended:17  Cancels/No shows: 3/1  Date of initial visit: 2022                Date of final visit: 08/15/2022      Subjective:  Pain:  [x] Yes  [] No   Location: R side of neck         Pain Rating: (0-10 scale) 2/10  Pain altered Tx:  [x] No  [] Yes  Action:  Comments: Patient reported a significant decrease in pain at final Rx. Pain avg 3/10, 7/10 at worst when she really over does it. Objective:  Test Measurements: AROM Cerv flex 42°, SB 15° bilateral, R rot 55 deg, L rot 50 deg  Strength: B shoulder 4-/5, B elbows 5/5, Finger abd L 5/5, L  42 lbs   Function: NDI 18% loss of function    Assessment:  STG:(to be met in 12 treatments)  ? Pain: Neck, upper back, L shoulder 3/10 average pain, 5/10 at worst Progress Toward  ? ROM:Cerv flex 40°, SB B 20°, B Rot WNL w/out increased pain-Met for all but SB   ? Strength: B Shoulder 4-/5, B elbows 4/5, finger abd L 4-/5, L  30 lbs - MET   ? Function: 18% loss of neck function MET   Pt report improved tolerance to sitting, standing, and driving to improve tolerance to working MET  Patient to be independent with home exercise program as demonstrated by performance with correct form without cues.  MET      Treatment to Date:  [x] Therapeutic Exercise    [] Modalities:  [] Therapeutic Activity    [] Ultrasound  [] Electrical Stimulation  [] Gait Training     [] Massage       [] Lumbar/Cervical Traction  [] Neuromuscular Re-education [x] Cold/hotpack [] Iontophoresis: 4 mg/mL  [x] Instruction in Home Exercise Program                     Dexamethasone Sodium  [x] Manual Therapy             Phosphate 40-80 mAmin  [] Aquatic Therapy                   [] Vasocompression/    [] Other:             Game Ready    Discharge Status:     [] Pt recovered from conditions. Treatment goals were met. [x] Pt received maximum benefit. No further therapy indicated at this time. [x] Pt to continue exercise/home instructions independently. [] Therapy interrupted due to:    [] Pt has 2 or more no shows/cancels, is discontinued per our policy. [x] Pt has completed prescribed number of treatment sessions. [] Other:         Electronically signed by Sharon Leal PT on 10/4/2022 at 3:57 PM        If you have any questions or concerns, please don't hesitate to call.   Thank you for your referral.

## 2023-08-04 ENCOUNTER — TELEMEDICINE (OUTPATIENT)
Dept: FAMILY MEDICINE CLINIC | Age: 63
End: 2023-08-04
Payer: MEDICAID

## 2023-08-04 ENCOUNTER — TELEPHONE (OUTPATIENT)
Dept: FAMILY MEDICINE CLINIC | Age: 63
End: 2023-08-04

## 2023-08-04 DIAGNOSIS — L08.9: Primary | ICD-10-CM

## 2023-08-04 DIAGNOSIS — Z12.31 ENCOUNTER FOR SCREENING MAMMOGRAM FOR MALIGNANT NEOPLASM OF BREAST: ICD-10-CM

## 2023-08-04 DIAGNOSIS — Z12.11 SCREEN FOR COLON CANCER: ICD-10-CM

## 2023-08-04 DIAGNOSIS — I10 PRIMARY HYPERTENSION: ICD-10-CM

## 2023-08-04 DIAGNOSIS — E78.2 MIXED HYPERLIPIDEMIA: ICD-10-CM

## 2023-08-04 DIAGNOSIS — S60.419D: Primary | ICD-10-CM

## 2023-08-04 DIAGNOSIS — Z76.89 ESTABLISHING CARE WITH NEW DOCTOR, ENCOUNTER FOR: ICD-10-CM

## 2023-08-04 PROCEDURE — 99214 OFFICE O/P EST MOD 30 MIN: CPT | Performed by: NURSE PRACTITIONER

## 2023-08-04 PROCEDURE — 3017F COLORECTAL CA SCREEN DOC REV: CPT | Performed by: NURSE PRACTITIONER

## 2023-08-04 PROCEDURE — G8427 DOCREV CUR MEDS BY ELIG CLIN: HCPCS | Performed by: NURSE PRACTITIONER

## 2023-08-04 RX ORDER — CEPHALEXIN 500 MG/1
500 CAPSULE ORAL 3 TIMES DAILY
Qty: 21 CAPSULE | Refills: 0 | Status: SHIPPED | OUTPATIENT
Start: 2023-08-04 | End: 2023-08-11

## 2023-08-04 RX ORDER — FLUCONAZOLE 100 MG/1
100 TABLET ORAL DAILY
Qty: 3 TABLET | Refills: 0 | Status: SHIPPED | OUTPATIENT
Start: 2023-08-04 | End: 2023-08-07

## 2023-08-04 RX ORDER — DOXYCYCLINE HYCLATE 100 MG
100 TABLET ORAL 2 TIMES DAILY
Qty: 10 TABLET | Refills: 0 | Status: SHIPPED | OUTPATIENT
Start: 2023-08-04 | End: 2023-08-09

## 2023-08-04 SDOH — ECONOMIC STABILITY: HOUSING INSECURITY
IN THE LAST 12 MONTHS, WAS THERE A TIME WHEN YOU DID NOT HAVE A STEADY PLACE TO SLEEP OR SLEPT IN A SHELTER (INCLUDING NOW)?: NO

## 2023-08-04 SDOH — ECONOMIC STABILITY: INCOME INSECURITY: HOW HARD IS IT FOR YOU TO PAY FOR THE VERY BASICS LIKE FOOD, HOUSING, MEDICAL CARE, AND HEATING?: NOT HARD AT ALL

## 2023-08-04 SDOH — ECONOMIC STABILITY: FOOD INSECURITY: WITHIN THE PAST 12 MONTHS, THE FOOD YOU BOUGHT JUST DIDN'T LAST AND YOU DIDN'T HAVE MONEY TO GET MORE.: NEVER TRUE

## 2023-08-04 SDOH — ECONOMIC STABILITY: FOOD INSECURITY: WITHIN THE PAST 12 MONTHS, YOU WORRIED THAT YOUR FOOD WOULD RUN OUT BEFORE YOU GOT MONEY TO BUY MORE.: NEVER TRUE

## 2023-08-04 ASSESSMENT — PATIENT HEALTH QUESTIONNAIRE - PHQ9
SUM OF ALL RESPONSES TO PHQ QUESTIONS 1-9: 0
1. LITTLE INTEREST OR PLEASURE IN DOING THINGS: 0
SUM OF ALL RESPONSES TO PHQ QUESTIONS 1-9: 0
SUM OF ALL RESPONSES TO PHQ9 QUESTIONS 1 & 2: 0
2. FEELING DOWN, DEPRESSED OR HOPELESS: 0

## 2023-08-04 NOTE — TELEPHONE ENCOUNTER
Tried to call patient to schedule her office visit for blood pressure check with provider, no answer and voicemail full

## 2023-08-04 NOTE — PROGRESS NOTES
Visit Information    Have you changed or started any medications since your last visit including any over-the-counter medicines, vitamins, or herbal medicines? no   Have you stopped taking any of your medications? Is so, why? -  no  Are you having any side effects from any of your medications? - no    Have you seen any other physician or provider since your last visit?  no   Have you had any other diagnostic tests since your last visit?  no   Have you been seen in the emergency room and/or had an admission in a hospital since we last saw you?  no   Have you had your routine dental cleaning in the past 6 months?  no     Do you have an active MyChart account? If no, what is the barrier?   Yes    Patient Care Team:  Mary Jo Trejo MD as PCP - General (Emergency Medicine)  Tony Castillo MD as PCP - Empaneled Provider  Trixie Spence MD as Consulting Physician (Endocrinology)    Medical History Review  Past Medical, Family, and Social History reviewed and  contribute to the patient presenting condition    Health Maintenance   Topic Date Due    COVID-19 Vaccine (1) Never done    Hepatitis C screen  Never done    DTaP/Tdap/Td vaccine (1 - Tdap) Never done    Colorectal Cancer Screen  Never done    Shingles vaccine (1 of 2) Never done    Breast cancer screen  07/07/2017    Flu vaccine (1) Never done    Depression Screen  09/22/2023    Lipids  01/27/2027    HIV screen  Completed    Hepatitis A vaccine  Aged Out    Hib vaccine  Aged Out    Meningococcal (ACWY) vaccine  Aged Out    Pneumococcal 0-64 years Vaccine  Aged Out    Diabetes screen  Discontinued    Cervical cancer screen  Discontinued
[] Abnormal -        [x] No Hallucinations    Other pertinent observable physical exam findings:-  See pictures taken during visit of right thumb           Please note that this chart was generated using voice recognition Dragon dictation software. Although every effort was made to ensure the accuracy of this automated transcription, some errors in transcription may have occurred. Michelle Judd, was evaluated through a synchronous (real-time) audio-video encounter. The patient (or guardian if applicable) is aware that this is a billable service, which includes applicable co-pays. This Virtual Visit was conducted with patient's (and/or legal guardian's) consent. Patient identification was verified, and a caregiver was present when appropriate.    The patient was located at Home: [address] [place] [address] [address] [place] [address]  Provider was located at Home ([facility]): [address] [place] [address]

## 2023-08-15 ENCOUNTER — OFFICE VISIT (OUTPATIENT)
Dept: FAMILY MEDICINE CLINIC | Age: 63
End: 2023-08-15
Payer: MEDICAID

## 2023-08-15 ENCOUNTER — HOSPITAL ENCOUNTER (OUTPATIENT)
Age: 63
Setting detail: SPECIMEN
Discharge: HOME | End: 2023-08-15

## 2023-08-15 VITALS
SYSTOLIC BLOOD PRESSURE: 138 MMHG | TEMPERATURE: 97 F | HEART RATE: 69 BPM | HEIGHT: 68 IN | WEIGHT: 181.2 LBS | DIASTOLIC BLOOD PRESSURE: 84 MMHG | BODY MASS INDEX: 27.46 KG/M2 | OXYGEN SATURATION: 92 %

## 2023-08-15 DIAGNOSIS — S60.419D: ICD-10-CM

## 2023-08-15 DIAGNOSIS — E78.2 MIXED HYPERLIPIDEMIA: ICD-10-CM

## 2023-08-15 DIAGNOSIS — I10 PRIMARY HYPERTENSION: ICD-10-CM

## 2023-08-15 DIAGNOSIS — I10 PRIMARY HYPERTENSION: Primary | ICD-10-CM

## 2023-08-15 DIAGNOSIS — L08.9: ICD-10-CM

## 2023-08-15 LAB
ALBUMIN SERPL-MCNC: 4.1 G/DL (ref 3.5–5.2)
ALBUMIN/GLOB SERPL: 1.5 {RATIO} (ref 1–2.5)
ALP SERPL-CCNC: 75 U/L (ref 35–104)
ALT SERPL-CCNC: 24 U/L (ref 5–33)
ANION GAP SERPL CALCULATED.3IONS-SCNC: 14 MMOL/L (ref 9–17)
AST SERPL-CCNC: 22 U/L
BASOPHILS # BLD: 0.05 K/UL (ref 0–0.2)
BASOPHILS NFR BLD: 1 % (ref 0–2)
BILIRUB SERPL-MCNC: 0.6 MG/DL (ref 0.3–1.2)
BUN SERPL-MCNC: 14 MG/DL (ref 8–23)
CALCIUM SERPL-MCNC: 9.9 MG/DL (ref 8.6–10.4)
CHLORIDE SERPL-SCNC: 105 MMOL/L (ref 98–107)
CHOLEST SERPL-MCNC: 246 MG/DL
CHOLESTEROL/HDL RATIO: 2.3
CO2 SERPL-SCNC: 23 MMOL/L (ref 20–31)
CREAT SERPL-MCNC: 0.9 MG/DL (ref 0.5–0.9)
EOSINOPHIL # BLD: 0.14 K/UL (ref 0–0.44)
EOSINOPHILS RELATIVE PERCENT: 3 % (ref 1–4)
ERYTHROCYTE [DISTWIDTH] IN BLOOD BY AUTOMATED COUNT: 12.5 % (ref 11.8–14.4)
GFR SERPL CREATININE-BSD FRML MDRD: >60 ML/MIN/1.73M2
GLUCOSE SERPL-MCNC: 58 MG/DL (ref 70–99)
HCT VFR BLD AUTO: 44.9 % (ref 36.3–47.1)
HDLC SERPL-MCNC: 105 MG/DL
HGB BLD-MCNC: 13.9 G/DL (ref 11.9–15.1)
IMM GRANULOCYTES # BLD AUTO: <0.03 K/UL (ref 0–0.3)
IMM GRANULOCYTES NFR BLD: 0 %
LDLC SERPL CALC-MCNC: 131 MG/DL (ref 0–130)
LYMPHOCYTES NFR BLD: 2.3 K/UL (ref 1.1–3.7)
LYMPHOCYTES RELATIVE PERCENT: 44 % (ref 24–43)
MCH RBC QN AUTO: 32.1 PG (ref 25.2–33.5)
MCHC RBC AUTO-ENTMCNC: 31 G/DL (ref 28.4–34.8)
MCV RBC AUTO: 103.7 FL (ref 82.6–102.9)
MONOCYTES NFR BLD: 0.44 K/UL (ref 0.1–1.2)
MONOCYTES NFR BLD: 8 % (ref 3–12)
NEUTROPHILS NFR BLD: 44 % (ref 36–65)
NEUTS SEG NFR BLD: 2.34 K/UL (ref 1.5–8.1)
NRBC BLD-RTO: 0 PER 100 WBC
PLATELET # BLD AUTO: 228 K/UL (ref 138–453)
PMV BLD AUTO: 12.3 FL (ref 8.1–13.5)
POTASSIUM SERPL-SCNC: 4.1 MMOL/L (ref 3.7–5.3)
PROT SERPL-MCNC: 6.8 G/DL (ref 6.4–8.3)
PTH-INTACT SERPL-MCNC: 34.4 PG/ML (ref 14–72)
RBC # BLD AUTO: 4.33 M/UL (ref 3.95–5.11)
RBC # BLD: ABNORMAL 10*6/UL
SODIUM SERPL-SCNC: 142 MMOL/L (ref 135–144)
TRIGL SERPL-MCNC: 51 MG/DL
TSH SERPL DL<=0.05 MIU/L-ACNC: 0.3 UIU/ML (ref 0.3–5)
WBC OTHER # BLD: 5.3 K/UL (ref 3.5–11.3)

## 2023-08-15 PROCEDURE — 99213 OFFICE O/P EST LOW 20 MIN: CPT | Performed by: NURSE PRACTITIONER

## 2023-08-15 PROCEDURE — G8419 CALC BMI OUT NRM PARAM NOF/U: HCPCS | Performed by: NURSE PRACTITIONER

## 2023-08-15 PROCEDURE — 3079F DIAST BP 80-89 MM HG: CPT | Performed by: NURSE PRACTITIONER

## 2023-08-15 PROCEDURE — G8427 DOCREV CUR MEDS BY ELIG CLIN: HCPCS | Performed by: NURSE PRACTITIONER

## 2023-08-15 PROCEDURE — 1036F TOBACCO NON-USER: CPT | Performed by: NURSE PRACTITIONER

## 2023-08-15 PROCEDURE — 3075F SYST BP GE 130 - 139MM HG: CPT | Performed by: NURSE PRACTITIONER

## 2023-08-15 PROCEDURE — 3017F COLORECTAL CA SCREEN DOC REV: CPT | Performed by: NURSE PRACTITIONER

## 2023-08-15 ASSESSMENT — ENCOUNTER SYMPTOMS
ABDOMINAL PAIN: 0
COUGH: 0
CHEST TIGHTNESS: 0
SHORTNESS OF BREATH: 0
VOMITING: 0
NAUSEA: 0

## 2023-08-15 NOTE — PROGRESS NOTES
Visit Information    Have you changed or started any medications since your last visit including any over-the-counter medicines, vitamins, or herbal medicines? no   Have you stopped taking any of your medications? Is so, why? -  no  Are you having any side effects from any of your medications? - no    Have you seen any other physician or provider since your last visit?  no   Have you had any other diagnostic tests since your last visit?  no   Have you been seen in the emergency room and/or had an admission in a hospital since we last saw you?  no   Have you had your routine dental cleaning in the past 6 months?  no     Do you have an active MyChart account? If no, what is the barrier?   Yes    Patient Care Team:  ANGELA Doherty CNP as PCP - General (Family Nurse Practitioner)  ANGELA Doherty CNP as PCP - Empaneled Provider  Saul Mcneal MD as Consulting Physician (Endocrinology)    Medical History Review  Past Medical, Family, and Social History reviewed and  contribute to the patient presenting condition    Health Maintenance   Topic Date Due    COVID-19 Vaccine (1) Never done    Hepatitis C screen  Never done    Colorectal Cancer Screen  Never done    Shingles vaccine (1 of 2) Never done    Breast cancer screen  07/07/2017    Flu vaccine (1) Never done    DTaP/Tdap/Td vaccine (1 - Tdap) 08/25/2023 (Originally 12/30/1979)    Depression Screen  08/04/2024    Lipids  01/27/2027    HIV screen  Completed    Hepatitis A vaccine  Aged Out    Hib vaccine  Aged Out    Meningococcal (ACWY) vaccine  Aged Out    Pneumococcal 0-64 years Vaccine  Aged Out    Diabetes screen  Discontinued    Cervical cancer screen  Discontinued

## 2023-12-14 ENCOUNTER — OFFICE VISIT (OUTPATIENT)
Dept: FAMILY MEDICINE CLINIC | Age: 63
End: 2023-12-14
Payer: COMMERCIAL

## 2023-12-14 VITALS
TEMPERATURE: 97.7 F | DIASTOLIC BLOOD PRESSURE: 86 MMHG | WEIGHT: 183 LBS | BODY MASS INDEX: 27.74 KG/M2 | HEART RATE: 77 BPM | OXYGEN SATURATION: 98 % | SYSTOLIC BLOOD PRESSURE: 126 MMHG | HEIGHT: 68 IN

## 2023-12-14 DIAGNOSIS — Z12.11 SCREEN FOR COLON CANCER: ICD-10-CM

## 2023-12-14 DIAGNOSIS — R05.1 ACUTE COUGH: ICD-10-CM

## 2023-12-14 DIAGNOSIS — J30.9 ALLERGIC RHINITIS, UNSPECIFIED SEASONALITY, UNSPECIFIED TRIGGER: Primary | ICD-10-CM

## 2023-12-14 DIAGNOSIS — B37.0 ORAL CANDIDIASIS: ICD-10-CM

## 2023-12-14 DIAGNOSIS — R09.82 POST-NASAL DRIP: ICD-10-CM

## 2023-12-14 PROCEDURE — G8427 DOCREV CUR MEDS BY ELIG CLIN: HCPCS | Performed by: NURSE PRACTITIONER

## 2023-12-14 PROCEDURE — G8419 CALC BMI OUT NRM PARAM NOF/U: HCPCS | Performed by: NURSE PRACTITIONER

## 2023-12-14 PROCEDURE — 3074F SYST BP LT 130 MM HG: CPT | Performed by: NURSE PRACTITIONER

## 2023-12-14 PROCEDURE — 1036F TOBACCO NON-USER: CPT | Performed by: NURSE PRACTITIONER

## 2023-12-14 PROCEDURE — 99213 OFFICE O/P EST LOW 20 MIN: CPT | Performed by: NURSE PRACTITIONER

## 2023-12-14 PROCEDURE — G8484 FLU IMMUNIZE NO ADMIN: HCPCS | Performed by: NURSE PRACTITIONER

## 2023-12-14 PROCEDURE — 3079F DIAST BP 80-89 MM HG: CPT | Performed by: NURSE PRACTITIONER

## 2023-12-14 PROCEDURE — 3017F COLORECTAL CA SCREEN DOC REV: CPT | Performed by: NURSE PRACTITIONER

## 2023-12-14 NOTE — PROGRESS NOTES
Visit Information    Have you changed or started any medications since your last visit including any over-the-counter medicines, vitamins, or herbal medicines? no   Have you stopped taking any of your medications? Is so, why? -  no  Are you having any side effects from any of your medications? - no    Have you seen any other physician or provider since your last visit?  no   Have you had any other diagnostic tests since your last visit?  no   Have you been seen in the emergency room and/or had an admission in a hospital since we last saw you?  no   Have you had your routine dental cleaning in the past 6 months?  no     Do you have an active MyChart account? If no, what is the barrier?   Yes    Patient Care Team:  ANGELA Rodriguez CNP as PCP - General (Family Nurse Practitioner)  ANGELA Rodriguez CNP as PCP - Empaneled Provider  Smooth Chowdhury MD as Consulting Physician (Endocrinology)    Medical History Review  Past Medical, Family, and Social History reviewed and  contribute to the patient presenting condition    Health Maintenance   Topic Date Due    COVID-19 Vaccine (1) Never done    Hepatitis C screen  Never done    DTaP/Tdap/Td vaccine (1 - Tdap) Never done    Colorectal Cancer Screen  Never done    Shingles vaccine (1 of 2) Never done    Breast cancer screen  07/07/2017    Respiratory Syncytial Virus (RSV) age 61 yrs+ (3 - 1-dose 60+ series) Never done    Flu vaccine (1) Never done    Depression Screen  08/04/2024    Lipids  08/15/2028    HIV screen  Completed    Hepatitis A vaccine  Aged Out    Hepatitis B vaccine  Aged Out    Hib vaccine  Aged Out    Meningococcal (ACWY) vaccine  Aged Out    Pneumococcal 0-64 years Vaccine  Aged Out    Diabetes screen  Discontinued    Cervical cancer screen  Discontinued
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
## 2023-12-28 DIAGNOSIS — I10 ESSENTIAL HYPERTENSION: ICD-10-CM

## 2023-12-28 RX ORDER — HYDROCHLOROTHIAZIDE 25 MG/1
TABLET ORAL
Qty: 90 TABLET | Refills: 1 | Status: SHIPPED | OUTPATIENT
Start: 2023-12-28

## 2024-02-27 ENCOUNTER — OFFICE VISIT (OUTPATIENT)
Dept: FAMILY MEDICINE CLINIC | Age: 64
End: 2024-02-27

## 2024-02-27 VITALS
HEIGHT: 68 IN | DIASTOLIC BLOOD PRESSURE: 66 MMHG | TEMPERATURE: 98.5 F | BODY MASS INDEX: 26.43 KG/M2 | OXYGEN SATURATION: 99 % | HEART RATE: 72 BPM | WEIGHT: 174.4 LBS | SYSTOLIC BLOOD PRESSURE: 112 MMHG

## 2024-02-27 DIAGNOSIS — S42.145B: Primary | ICD-10-CM

## 2024-02-27 PROCEDURE — 3017F COLORECTAL CA SCREEN DOC REV: CPT | Performed by: NURSE PRACTITIONER

## 2024-02-27 PROCEDURE — 1036F TOBACCO NON-USER: CPT | Performed by: NURSE PRACTITIONER

## 2024-02-27 PROCEDURE — G8419 CALC BMI OUT NRM PARAM NOF/U: HCPCS | Performed by: NURSE PRACTITIONER

## 2024-02-27 PROCEDURE — 99213 OFFICE O/P EST LOW 20 MIN: CPT | Performed by: NURSE PRACTITIONER

## 2024-02-27 PROCEDURE — G8484 FLU IMMUNIZE NO ADMIN: HCPCS | Performed by: NURSE PRACTITIONER

## 2024-02-27 PROCEDURE — 3078F DIAST BP <80 MM HG: CPT | Performed by: NURSE PRACTITIONER

## 2024-02-27 PROCEDURE — 3074F SYST BP LT 130 MM HG: CPT | Performed by: NURSE PRACTITIONER

## 2024-02-27 PROCEDURE — G8427 DOCREV CUR MEDS BY ELIG CLIN: HCPCS | Performed by: NURSE PRACTITIONER

## 2024-02-27 ASSESSMENT — PATIENT HEALTH QUESTIONNAIRE - PHQ9
1. LITTLE INTEREST OR PLEASURE IN DOING THINGS: 0
SUM OF ALL RESPONSES TO PHQ QUESTIONS 1-9: 0
2. FEELING DOWN, DEPRESSED OR HOPELESS: 0
SUM OF ALL RESPONSES TO PHQ9 QUESTIONS 1 & 2: 0

## 2024-02-27 ASSESSMENT — ENCOUNTER SYMPTOMS
SHORTNESS OF BREATH: 0
COUGH: 0
NAUSEA: 0
BACK PAIN: 0
VOMITING: 0
ABDOMINAL PAIN: 0
CHEST TIGHTNESS: 0
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
## 2024-02-27 NOTE — PROGRESS NOTES
Visit Information    Have you changed or started any medications since your last visit including any over-the-counter medicines, vitamins, or herbal medicines? no   Have you stopped taking any of your medications? Is so, why? -  no  Are you having any side effects from any of your medications? - no    Have you seen any other physician or provider since your last visit?  no   Have you had any other diagnostic tests since your last visit?  no   Have you been seen in the emergency room and/or had an admission in a hospital since we last saw you?  no   Have you had your routine dental cleaning in the past 6 months?  no     Do you have an active MyChart account? If no, what is the barrier?  Yes    Patient Care Team:  Jaz Irving APRN - CNP as PCP - General (Family Nurse Practitioner)  Jaz Irving APRN - CNP as PCP - Empaneled Provider  Emma Gonzalez MD as Consulting Physician (Endocrinology)    Medical History Review  Past Medical, Family, and Social History reviewed and  contribute to the patient presenting condition    Health Maintenance   Topic Date Due    COVID-19 Vaccine (1) Never done    Hepatitis C screen  Never done    DTaP/Tdap/Td vaccine (1 - Tdap) Never done    Colorectal Cancer Screen  Never done    Shingles vaccine (1 of 2) Never done    Breast cancer screen  07/07/2017    Respiratory Syncytial Virus (RSV) Pregnant or age 60 yrs+ (1 - 1-dose 60+ series) Never done    Flu vaccine (1) Never done    Depression Screen  08/04/2024    Lipids  08/15/2028    HIV screen  Completed    Hepatitis A vaccine  Aged Out    Hepatitis B vaccine  Aged Out    Hib vaccine  Aged Out    Polio vaccine  Aged Out    Meningococcal (ACWY) vaccine  Aged Out    Pneumococcal 0-64 years Vaccine  Aged Out    Diabetes screen  Discontinued    Cervical cancer screen  Discontinued

## 2024-03-08 ENCOUNTER — HOSPITAL ENCOUNTER (OUTPATIENT)
Dept: MRI IMAGING | Age: 64
Discharge: HOME | End: 2024-03-08

## 2024-03-08 DIAGNOSIS — S42.145B: ICD-10-CM

## 2024-03-08 PROCEDURE — 73221 MRI JOINT UPR EXTREM W/O DYE: CPT

## 2024-03-13 ENCOUNTER — OFFICE VISIT (OUTPATIENT)
Dept: ORTHOPEDIC SURGERY | Age: 64
End: 2024-03-13
Payer: MEDICAID

## 2024-03-13 VITALS — BODY MASS INDEX: 26.67 KG/M2 | HEIGHT: 68 IN | WEIGHT: 176 LBS

## 2024-03-13 DIAGNOSIS — S43.005A DISLOCATION OF LEFT SHOULDER JOINT, INITIAL ENCOUNTER: Primary | ICD-10-CM

## 2024-03-13 PROCEDURE — 99203 OFFICE O/P NEW LOW 30 MIN: CPT

## 2024-03-13 RX ORDER — MELOXICAM 15 MG/1
15 TABLET ORAL DAILY
Qty: 30 TABLET | Refills: 1 | Status: SHIPPED | OUTPATIENT
Start: 2024-03-13

## 2024-03-13 SDOH — HEALTH STABILITY: PHYSICAL HEALTH: ON AVERAGE, HOW MANY MINUTES DO YOU ENGAGE IN EXERCISE AT THIS LEVEL?: 30 MIN

## 2024-03-13 SDOH — HEALTH STABILITY: PHYSICAL HEALTH: ON AVERAGE, HOW MANY DAYS PER WEEK DO YOU ENGAGE IN MODERATE TO STRENUOUS EXERCISE (LIKE A BRISK WALK)?: 3 DAYS

## 2024-03-13 NOTE — PROGRESS NOTES
[facility]  [address]  [place] [address]  Dept: [phone]    Orthopaedic Clinic - New Patient      CHIEF COMPLAINT:    Chief Complaint   Patient presents with    Joint Pain     New Pt second opinion Lt shoulder injury. DOI 2/17/2024       HISTORY OF PRESENT ILLNESS:    The patient is a 63 y.o. female who is being seen as a new patient for who is seen today for consultation evaluation for left shoulder pain has been ongoing since 2/17/2024, approximately 1 month ago, after the patient fell from standing height after she tripped over her her dog, and stated that she felt the shoulder dislocated and spontaneously reduced.  She then went to the emergency department, and was told to follow-up in [facility], in which she saw Dr. Poole, in which she was informed that she sustained a inferior glenoid fracture  and was instructed to follow-up with therapy as well as work on pendulum exercises.  She comes in today for second opinion, and the patient states that she does not take any pain medications, besides Tylenol, and has not followed with physical therapy.  She states that she was given a sling, but misplaced the sling as the patient does not have one currently today.  She denies any numbness or tingling to the left lower extremity, she denies pain elsewhere.  Currently, she is unable to range her shoulder due to significant pain, and states that most of the shoulder pain is located at the anterior shoulder joint.  She denies any feelings of instability of her shoulder at this time.  She has not been working on any home exercises.  Past medical history stated below.      Past Medical History:    Past Medical History:   Diagnosis Date    Chronic sinusitis     Chronic venous hypertension 1/14/2014    Elevated blood pressure     ESBL (extended spectrum beta-lactamase) producing bacteria infection 1/21/2014    E. Coli urine

## 2024-03-20 ENCOUNTER — HOSPITAL ENCOUNTER (OUTPATIENT)
Dept: PHYSICAL THERAPY | Age: 64
Setting detail: THERAPIES SERIES
Discharge: HOME | End: 2024-03-20

## 2024-03-20 PROCEDURE — 97162 PT EVAL MOD COMPLEX 30 MIN: CPT

## 2024-03-20 PROCEDURE — 97110 THERAPEUTIC EXERCISES: CPT

## 2024-03-20 PROCEDURE — 97530 THERAPEUTIC ACTIVITIES: CPT

## 2024-03-20 NOTE — CONSULTS
[x] [facility]  Outpatient Rehabilitation &  Therapy  [address] [place] [address]  [phone]  [phone]              Physical Therapy Upper Extremity Evaluation    Date:  3/20/2024  Patient: Amy Pacheco  : 1960  MRN: [id number]  Physician: Lorne Perez, ; Jason Gama DO     Insurance: PENDING MEDICAID, FA assistance pending   Medical Diagnosis: Dislocation of left shoulder joint S43.005A     Rehab Codes: M25.512, M25.612, M79.622, M62.512, M62.522, M62.532, R29.3  Onset Date: 2024   Next 's appt: 05/15/2024    Subjective:   HPI/CC:Pt reports 2024 tripped over large dog (at her brothers), fell backward, and twisted her body to L side, landing on outstretched L hand, dislocating L shoulder.  Shoulder popped back in place as getting up.  3/8  MRI shows inf glenoid fx, ant inf labrum tear, partial thickness tears supraspinatus and infraspinatus, and tendinosis subscap and long head biceps.  Pt wore sling first few weeks.  Pt cont w/pain in bone, ball of arm, and can't raise her arm.  Pt demonstrates only able to raise to 45° abd.  Pt feels like ball of shoulder is slipping when moving arm out to side, has to use R arm to help bring L arm back to body when reaches forward.  Pain constantly at 5/10.  Pain increases w/standing for periods, sitting without support, and moving L arm, up to 8/10.  When sitting upright gets burning from shoulder up to neck.    Pain decreases w/CP, tylenol.  Pt reports with taking tylenol isn't as stiff and notes her hand isn't as swollen.  Pt reports has prescription for motrin, is planning to get soon.  Pt reports she is compensating for L UE w/R UE.  At first had to bring keyboard down to lap to type, now able to rest forearm on keyboard to type.  Holding things in hand is gradually improving, at first couldn't hold phone in L hand, now can hold phone, but won't walk while holding cup in L hand.   Now is able to reach to her ear,

## 2024-03-22 ENCOUNTER — HOSPITAL ENCOUNTER (OUTPATIENT)
Dept: PHYSICAL THERAPY | Age: 64
Setting detail: THERAPIES SERIES
Discharge: HOME | End: 2024-03-22

## 2024-03-22 PROCEDURE — 97016 VASOPNEUMATIC DEVICE THERAPY: CPT

## 2024-03-22 PROCEDURE — 97110 THERAPEUTIC EXERCISES: CPT

## 2024-03-22 NOTE — FLOWSHEET NOTE
[x] [facility]  Outpatient Rehabilitation &  Therapy  [address]  [phone]  [phone]              Physical Therapy Daily Treatment Note    Date:  3/22/2024  Patient Name:  Amy Pacheco  \"Dannielle\"  :  1960  MRN: [id number]  Physician: Lorne Perez DO; Jason Gama DO                                     Insurance: PENDING MEDICAID, FA assistance pending   Medical Diagnosis: Dislocation of left shoulder joint S43.005A                    Rehab Codes: M25.512, M25.612, M79.622, M62.512, M62.522, M62.532, R29.3  Onset Date: 2024                    Next 's appt: 05/15/2024  Visit# / total visits: ; Recheck goals due at visit 10     Cancels/No Shows: 0/0    Subjective:    Pain:  [x] Yes  [] No Location:  L Shoulder   Pain Rating: (0-10 scale) 5/10  Pain altered Tx:  [x] No  [] Yes  Action:  Comments: Pt reports constant pain at 5/10.      3/8  MRI shows inf glenoid fx, ant inf labrum tear, partial thickness tears supraspinatus and infraspinatus, and tendinosis subscap and long head biceps.       Objective:  Modalities: Game ready L Shoulder, 34°, low pressure, seated in chair, at end of Rx  Precautions:  Exercises:  Exercise Reps/ Time Weight/ Level Comments         Blade's  10x ea  Ant/post, med/lat, cw, ccw; added 3/22 Pt reports feels like something is wrong in shoulder          Table slides  10x 5sec Flex, Added 3/22; Limited ROM         Open close fingers  20x  Added 3/22   Fingertips to thumb  10x  Added 3/22   Elbow flex/ext  20x  Added 3/22   Pronation/supination 20x  Added 3/22               Supine             PROM 17 min  Shoulder all planes, elbow; w/gentle oscillations                                       Other:      Treatment Charges: Mins Units   []  Modalities     [x]  Ther Exercise 44 3   []  Manual Therapy     []  Ther Activities     []  Neuro Re-ed     [x]  Vasocompression 15 1   [] Gait     []  Other     Total Billable time 59

## 2024-03-25 ENCOUNTER — HOSPITAL ENCOUNTER (OUTPATIENT)
Dept: PHYSICAL THERAPY | Age: 64
Setting detail: THERAPIES SERIES
Discharge: HOME | End: 2024-03-25

## 2024-03-25 PROCEDURE — 97016 VASOPNEUMATIC DEVICE THERAPY: CPT

## 2024-03-25 PROCEDURE — 97110 THERAPEUTIC EXERCISES: CPT

## 2024-03-25 NOTE — FLOWSHEET NOTE
[x] [facility]  Outpatient Rehabilitation &  Therapy  [address]  [phone]  [phone]     Physical Therapy Daily Treatment Note    Date:  3/25/2024  Patient Name:  Amy Pacheco  \"Dannielle\"  :  1960  MRN: [id number]  Physician: Lorne Perez DO; Jason Gama DO                                     Insurance: PENDING MEDICAID, FA assistance pending   Medical Diagnosis: Dislocation of left shoulder joint S43.005A                    Rehab Codes: M25.512, M25.612, M79.622, M62.512, M62.522, M62.532, R29.3  Onset Date: 2024                    Next 's appt: 05/15/2024  Visit# / total visits: 3/20; Recheck goals due at visit 10     Cancels/No Shows: 0/0    Subjective:    Pain:  [x] Yes  [] No Location:  L Shoulder   Pain Rating: (0-10 scale) 5/10  Pain altered Tx:  [x] No  [] Yes  Action:  Comments: Patient states pain is still 5/10 in shoulder. Feels like she is able to move shoulder a little better but still has pain with certain movements.      3/8  MRI shows inf glenoid fx, ant inf labrum tear, partial thickness tears supraspinatus and infraspinatus, and tendinosis subscap and long head biceps.       Objective:  Modalities: Game ready L Shoulder, 34°, low pressure, seated in chair, at end of Rx for 15 minutes  Precautions:  Exercises:  Exercise Reps/ Time Weight/ Level Comments         Codman's  10x ea  Ant/post, med/lat, cw, ccw; Pt reports feels like something is wrong in shoulder          Table slides  10x 5sec Flex; Limited ROM         Open close fingers 20x     Fingertips to thumb  10x     Elbow flex/ext   20x     Pronation/supination 20x                 Supine             PROM 10 min  Shoulder all planes, elbow; w/gentle oscillations                                       Other:      Treatment Charges: Mins Units   []  Modalities     [x]  Ther Exercise 50 3   []  Manual Therapy     []  Ther Activities     []  Neuro Re-ed     [x]  Vasocompression 15

## 2024-03-27 ENCOUNTER — HOSPITAL ENCOUNTER (OUTPATIENT)
Dept: PHYSICAL THERAPY | Age: 64
Setting detail: THERAPIES SERIES
Discharge: HOME | End: 2024-03-27

## 2024-03-27 PROCEDURE — 97110 THERAPEUTIC EXERCISES: CPT

## 2024-03-27 PROCEDURE — 97016 VASOPNEUMATIC DEVICE THERAPY: CPT

## 2024-03-27 NOTE — FLOWSHEET NOTE
[x] [facility]  Outpatient Rehabilitation &  Therapy  [address]  [phone]  [phone]     Physical Therapy Daily Treatment Note    Date:  3/27/2024  Patient Name:  Amy Pacheco  \"Dannielle\"  :  1960  MRN: [id number]  Physician: Lorne Perez DO; Jason Gama DO                                     Insurance: PENDING MEDICAID, FA assistance pending   Medical Diagnosis: Dislocation of left shoulder joint S43.005A                    Rehab Codes: M25.512, M25.612, M79.622, M62.512, M62.522, M62.532, R29.3  Onset Date: 2024                    Next 's appt: 05/15/2024  Visit# / total visits: ; Recheck goals due at visit 10     Cancels/No Shows: 0/0    Subjective:    Pain:  [x] Yes  [] No Location:  L Shoulder   Pain Rating: (0-10 scale) 4-5/10  Pain altered Tx:  [x] No  [] Yes  Action:  Comments: Did a lot yesterday states she is good for 4-5 hours and then is fatigued and shoulder becomes painful. Woke up this morning feeling pretty good until she moved the shoulder wrong and has pain/soreness.       Objective:  Modalities: Game ready L Shoulder, 34°, low pressure, seated in chair, at end of Rx for 15 minutes  Precautions:  Exercises:  Exercise Reps/ Time Weight/ Level Comments         Blade's  10x ea  Ant/post, med/lat, cw, ccw; Pt reports feels like something is wrong in shoulder          Table slides  10x 5sec Flex; Limited ROM attempted scaption         Open close fingers  20x     Fingertips to thumb   10x     Elbow flex/ext   20x     Pronation/supination 20x                 Supine             PROM 10 min  Shoulder all planes, elbow; w/gentle oscillations                                       Other:    Manual: trigger point release to left upper trap to decrease tightness with good releases 5 minutes total    Treatment Charges: Mins Units   []  Modalities     [x]  Ther Exercise 40 3   [x]  Manual Therapy 5 0   []  Ther Activities     []  Neuro

## 2024-03-28 ENCOUNTER — APPOINTMENT (OUTPATIENT)
Dept: PHYSICAL THERAPY | Age: 64
End: 2024-03-28

## 2024-03-29 ENCOUNTER — HOSPITAL ENCOUNTER (OUTPATIENT)
Dept: PHYSICAL THERAPY | Age: 64
Setting detail: THERAPIES SERIES
Discharge: HOME | End: 2024-03-29

## 2024-03-29 NOTE — FLOWSHEET NOTE
[x] [facility]  Outpatient Rehabilitation &  Therapy  [address]  [phone]  [phone]     Therapy Cancel/No Show note    Date: 3/29/2024  Patient: Amy Pacheco  : 1960  MRN: [id number]    Cancels/No Shows to date:     For today's appointment patient:    [x]  Cancelled    [] Rescheduled appointment    [] No-show     Reason given by patient:    []  Patient ill    []  Conflicting appointment    [] No transportation      [] Conflict with work    [] No reason given    [] Weather related    [] COVID-19    [x] Other: \"something came up\"      Comments:        [x] Next appointment was confirmed    Electronically signed by: ASHISH SHI PTA

## 2024-03-29 NOTE — TELEPHONE ENCOUNTER
Received call from patient stating she was in 2 weeks ago and still has not received her pain medication. Pt upset this was not called in already. Pt was noncompliant with verifying information. Writer notified patient that 3 identifiers were needed to confirm patient before being able to transfer call to office. Pt stated she was sick of the run around & was going to come up to the office and take care of things herself if she had to answer anymore dumb questions.    Pt asked to speak with nurse but hung up when caller was attempting to reach office. Writer attempted to call patient back but unable to reach patient.     Please advise.    Call back#: [phone]

## 2024-03-29 NOTE — TELEPHONE ENCOUNTER
63 y.o. female with left anterior shoulder dislocation with associated large bony Bankart lesion, anterior-inferior labral tear, as well as a partial tear of the supraspinatus/subscapularis     Called office with concerns as she did not rec her order for Mobic. Med pended.     Sending to on-call, resident with pager, and last resident to see pt

## 2024-04-01 ENCOUNTER — HOSPITAL ENCOUNTER (OUTPATIENT)
Dept: PHYSICAL THERAPY | Age: 64
Setting detail: THERAPIES SERIES
Discharge: HOME | End: 2024-04-01

## 2024-04-01 PROCEDURE — 97016 VASOPNEUMATIC DEVICE THERAPY: CPT

## 2024-04-01 PROCEDURE — 97110 THERAPEUTIC EXERCISES: CPT

## 2024-04-01 NOTE — FLOWSHEET NOTE
Improved tolerance with table slides this date, verbal cues to relax arm and let her body move her arm instead of AROM, reports of noticing increase in ROM as she warms up. Minimal cues for UT compensation, patient with good recall from previous vcs. Reports increase in bicep tightness after completing flex/ext and open/close fingers. Completed light distraction with PROM with improved tolerance. Ended session with vaso to reduce onset of DOMS.    [x] No change in pain.     [] Other:  [x] Patient would continue to benefit from skilled physical therapy services in order to: decrease pain L shoulder, increase ROM L Shoulder, forearm, increase strength L UE, and improve computer use, ADLs, doing her hair, driving, household activities, standing tolerance, and work activities.         STG: (to be met in 10 treatments)  ? Pain: L Shoulder 4/10 average pain, 6/10 at worst   ? ROM: L Shoulder supine flex , abd 90°, ER 30°, IR 50° (@ 45° abd), pronation 50°, supination 75° to improve computer use, driving and ADLs   ? Strength: L Shoulder 3-/5, L elbow 3+/5, L  46 lbs, to improve driving, ADLs, and tolerance to standing  ? Function: UEFI 64% loss of UE function   Patient to be independent with home exercise program as demonstrated by performance with correct form without cues.     LTG: (to be met in 20 treatments)  ? Pain: L Shoulder 2/10 average pain, 4/10 at worst   ? ROM: L Shoulder supine flex, abd 120°, ER 50°, IR 60° (@ 90° abd), pronation 65°,  to improve driving, ADLs, and household activities   ? Strength: L Shoulder 3+/5, L elbow 4/5, L  54 lbs to improve household and work activities   ? Function: UEFI 40% loss of UE function   Pt return to working normal amounts, driving distances w/out difficulty     Pt. Education:  [x] Yes  [] No  [] Reviewed Prior HEP/Ed  Method of Education: [x] Verbal  [x] Demo  [] Written  Comprehension of Education:  [x] Verbalizes understanding-purpose, correct technique ex,

## 2024-04-02 RX ORDER — MELOXICAM 15 MG/1
15 TABLET ORAL DAILY
Qty: 30 TABLET | Refills: 1 | Status: SHIPPED | OUTPATIENT
Start: 2024-04-02

## 2024-04-03 ENCOUNTER — HOSPITAL ENCOUNTER (OUTPATIENT)
Dept: PHYSICAL THERAPY | Age: 64
Setting detail: THERAPIES SERIES
Discharge: HOME | End: 2024-04-03

## 2024-04-03 PROCEDURE — 97110 THERAPEUTIC EXERCISES: CPT

## 2024-04-03 NOTE — FLOWSHEET NOTE
understanding.  Educated patient on correct technique for codmans exercises, needs review to make sure properly performed.  [x] Needs review.  [] Demonstrates/verbalizes HEP/Ed previously given.     Plan: [x] Continue current frequency toward long and short term goals.    2-3 x/week for 20 visits  Pt requests to begin only doing 2x per week due to cost, as she waits for her Medicaid to be approved   [x] Specific Instructions for subsequent treatments: wand ex; cont PROM, progress hand, forearm ex, issue HEP; progress AAROM ex, shoulder stretches per Pt tolerance, consider ES for pain control        Time In: 0915am           Time Out: 0945am    Electronically signed by:  JENNIFER RUSSELL PTA

## 2024-04-05 ENCOUNTER — HOSPITAL ENCOUNTER (OUTPATIENT)
Dept: PHYSICAL THERAPY | Age: 64
Setting detail: THERAPIES SERIES
End: 2024-04-05
Payer: MEDICAID

## 2024-04-09 ENCOUNTER — HOSPITAL ENCOUNTER (OUTPATIENT)
Dept: PHYSICAL THERAPY | Age: 64
Setting detail: THERAPIES SERIES
Discharge: HOME | End: 2024-04-09

## 2024-04-09 PROCEDURE — 97140 MANUAL THERAPY 1/> REGIONS: CPT

## 2024-04-09 PROCEDURE — 97110 THERAPEUTIC EXERCISES: CPT

## 2024-04-09 NOTE — FLOWSHEET NOTE
Written  Comprehension of Education:  [x] Verbalizes understanding-purpose, correct technique ex, shoulder mechanics  [x] Demonstrates understanding.  Educated patient on correct technique for codmans exercises, needs review to make sure properly performed.  [x] Needs review.  [] Demonstrates/verbalizes HEP/Ed previously given.     Plan: [x] Continue current frequency toward long and short term goals.    2-3 x/week for 20 visits  Pt requests to begin only doing 2x per week due to cost, as she waits for her Medicaid to be approved   [x] Specific Instructions for subsequent treatments: supine cane exercises      Time In: 9:21 am           Time Out: 10:14 am    Electronically signed by:  ASHISH SHI PTA

## 2024-04-10 ENCOUNTER — HOSPITAL ENCOUNTER (OUTPATIENT)
Dept: PHYSICAL THERAPY | Age: 64
Setting detail: THERAPIES SERIES
Discharge: HOME | End: 2024-04-10

## 2024-04-10 PROCEDURE — 97016 VASOPNEUMATIC DEVICE THERAPY: CPT

## 2024-04-10 PROCEDURE — 97110 THERAPEUTIC EXERCISES: CPT

## 2024-04-10 NOTE — FLOWSHEET NOTE
[x] [facility]  Outpatient Rehabilitation &  Therapy  [address]  [phone]  [phone]     Physical Therapy Daily Treatment Note    Date:  4/10/2024  Patient Name:  Amy Pacheco  \"Dannielle\"  :  1960  MRN: [id number]  Physician: Lorne Perez DO; Jason Gaam DO                                     Insurance: PENDING MEDICAID, FA assistance pending   Medical Diagnosis: Dislocation of left shoulder joint S43.005A                    Rehab Codes: M25.512, M25.612, M79.622, M62.512, M62.522, M62.532, R29.3  Onset Date: 2024                    Next 's appt: 05/15/2024  Visit# / total visits: ; Recheck goals due at visit 10     Cancels/No Shows: 0/0    Subjective:    Pain:  [x] Yes  [] No Location:  L Shoulder   Pain Rating: (0-10 scale) 3/10  Pain altered Tx:  [] No  [x] Yes  Action:decreased some reps due to pain  Comments: Woke up with no pain this morning until she had to get dressed. States pain upon arrival is 3/10. Has been using left shoulder more as able. States she was fatigued after PT session and driving for work yesterday. Feel asleep early (after dinner) yesterday.       Objective:  Modalities: Game ready L Shoulder, 34°, low pressure, seated in chair, at end of Rx for 15 minutes  Precautions:  Exercises:  Exercise Reps/ Time Weight/ Level Comments         Codman's   10x ea  Ant/post, med/lat, cw, ccw; Pt reports feels like something is wrong in shoulder          Table slides   10x 5sec Flex; scaption; circles   Stool scoots 10x  abduction         Open close fingers      HEP this date   Fingertips to thumb     HEP this date   Elbow flex/ext     HEP this date   Pronation/supination    HEP this date               Supine             PROM 10 min  Shoulder all planes, elbow; w/gentle oscillations          Cane   PVC cane-- also therapist assist needed   Chest press 5x     Flexion 3x                 Other:    Manual: trigger point release to

## 2024-04-12 ENCOUNTER — HOSPITAL ENCOUNTER (OUTPATIENT)
Dept: PHYSICAL THERAPY | Age: 64
Setting detail: THERAPIES SERIES
Discharge: HOME | End: 2024-04-12

## 2024-04-12 PROCEDURE — 97110 THERAPEUTIC EXERCISES: CPT

## 2024-04-12 PROCEDURE — 97140 MANUAL THERAPY 1/> REGIONS: CPT

## 2024-04-12 NOTE — FLOWSHEET NOTE
[x] [facility]  Outpatient Rehabilitation &  Therapy  [address]  [phone]  [phone]     Physical Therapy Daily Treatment Note    Date:  2024  Patient Name:  Amy Pacheco  \"Dannielle\"  :  1960  MRN: [id number]  Physician: Lorne Perez DO; Jason Gama DO                                     Insurance: PENDING MEDICAID, FA assistance pending   Medical Diagnosis: Dislocation of left shoulder joint S43.005A                    Rehab Codes: M25.512, M25.612, M79.622, M62.512, M62.522, M62.532, R29.3  Onset Date: 2024                    Next 's appt: 05/15/2024  Visit# / total visits: ; Recheck goals due at visit 10     Cancels/No Shows: 0/0    Subjective:    Pain:  [x] Yes  [] No Location:  L Shoulder   Pain Rating: (0-10 scale) 3/10  Pain altered Tx:  [] No  [x] Yes  Action: decreased some reps due to pain  Comments: Patient states pain continues to be 3/10. Notes she worked for 6 hours typing on computer yesterday, some upper trap tightness noted.       Objective:  Modalities: Game ready L Shoulder, 34°, low pressure, seated in chair, at end of Rx for 15 minutes--HELD this date  Precautions:  Exercises:  Exercise Reps/ Time Weight/ Level Comments         Codman's   10x ea  Ant/post, med/lat, cw, ccw; Pt reports feels like something is wrong in shoulder          Table slides / scoots 10x 5sec Flex; scaption; circles   Stool scoots 10x  abduction         Open close fingers      HEP this date   Fingertips to thumb     HEP this date   Elbow flex/ext     HEP this date   Pronation/supination    HEP this date               Supine             PROM 10 min  Shoulder all planes, elbow; w/gentle oscillations          Cane   PVC cane-- also therapist assist needed   Chest press 5x     Flexion 5x                 Other:    Manual: trigger point release to left upper trap to decrease tightness with good releases 8 minutes total    Treatment Charges: Mins

## 2024-04-15 ENCOUNTER — HOSPITAL ENCOUNTER (OUTPATIENT)
Dept: PHYSICAL THERAPY | Age: 64
Setting detail: THERAPIES SERIES
Discharge: HOME | End: 2024-04-15

## 2024-04-15 PROCEDURE — 97140 MANUAL THERAPY 1/> REGIONS: CPT

## 2024-04-15 PROCEDURE — 97110 THERAPEUTIC EXERCISES: CPT

## 2024-04-15 NOTE — FLOWSHEET NOTE
PROM 10 min  Shoulder all planes, elbow; w/gentle oscillations          Cane   PVC cane-- also therapist assist needed less this date   Chest press 5x     Flexion                  Other:    Manual: trigger point release to left upper trap, deltoid, and tricep to decrease tightness with good releases 8 minutes total    Treatment Charges: Mins Units   []  Modalities     [x]  Ther Exercise 40 2   [x]  Manual Therapy 8 1   []  Ther Activities     []  Neuro Re-ed     []  Vasocompression     [] Gait     []  Other     Total Billable time 48 mins 3       Assessment: [x] Progressing toward goals. Continued exercises per log to increase shoulder ROM. Noted a little better tolerance and ROM with supine cane chest press this date with less assistance from writer. Focused on manual to deltoid/tricep with upper trap to decrease tightness. Patient notes feeling decrease stiffness after session. Trial hot pack at end of session to bicep/tricep region to further decrease tightness/stiffness. Patient felt better after treatment. Check patient response to hot pack next session.    [] No change in pain.     [] Other:    [x] Patient would continue to benefit from skilled physical therapy services in order to: decrease pain L shoulder, increase ROM L Shoulder, forearm, increase strength L UE, and improve computer use, ADLs, doing her hair, driving, household activities, standing tolerance, and work activities.         STG: (to be met in 10 treatments)  ? Pain: L Shoulder 4/10 average pain, 6/10 at worst   ? ROM: L Shoulder supine flex , abd 90°, ER 30°, IR 50° (@ 45° abd), pronation 50°, supination 75° to improve computer use, driving and ADLs   ? Strength: L Shoulder 3-/5, L elbow 3+/5, L  46 lbs, to improve driving, ADLs, and tolerance to standing  ? Function: UEFI 64% loss of UE function   Patient to be independent with home exercise program as demonstrated by performance with correct form without cues.     LTG: (to be met in 20

## 2024-04-17 ENCOUNTER — HOSPITAL ENCOUNTER (OUTPATIENT)
Dept: PHYSICAL THERAPY | Age: 64
Setting detail: THERAPIES SERIES
Discharge: HOME | End: 2024-04-17

## 2024-04-17 PROCEDURE — 97140 MANUAL THERAPY 1/> REGIONS: CPT

## 2024-04-17 PROCEDURE — 97110 THERAPEUTIC EXERCISES: CPT

## 2024-04-17 NOTE — FLOWSHEET NOTE
[x] [facility]  Outpatient Rehabilitation &  Therapy  [address]  [phone]  [phone]     Physical Therapy Daily Treatment Note    Date:  2024  Patient Name:  Amy Pacheco  \"Dannielle\"  :  1960  MRN: [id number]  Physician: Lorne Perez DO; Jason Gama DO                                     Insurance: PENDING MEDICAID, FA assistance pending   Medical Diagnosis: Dislocation of left shoulder joint S43.005A                    Rehab Codes: M25.512, M25.612, M79.622, M62.512, M62.522, M62.532, R29.3  Onset Date: 2024                    Next 's appt: 05/15/2024  Visit# / total visits: ; Recheck goals due at visit 10     Cancels/No Shows: 0/0    Subjective:    Pain:  [x] Yes  [] No Location:  L Shoulder   Pain Rating: (0-10 scale) 3/10  Pain altered Tx:  [] No  [x] Yes  Action: decreased some reps due to pain  Comments: States she did have some pain after last PT session but thinks it was okay as we were doing manual to shoulder also. Pain still is 3/10 upon arrival.      Objective:  Modalities: Game ready L Shoulder, 34°, low pressure, seated in chair, at end of Rx for 15 minutes--HELD this date  hot pack to bicep/tricep/deltoid region to decrease tightness for 10 minutes after exercises supine, wedge under legs---HELD this date, patient declined need  Precautions:  Exercises:  Exercise Reps/ Time Weight/ Level Comments   Pulleys 15x  flexion   Codman's   10x ea  Ant/post, med/lat, cw, ccw; Pt reports feels like something is wrong in shoulder          Table slides / scoots 10x 5sec Flex; scaption; circles   Stool scoots 10x  abduction         Open close fingers      HEP this date   Fingertips to thumb     HEP this date   Elbow flex/ext     HEP this date   Pronation/supination    HEP this date               Supine             PROM 10 min  Shoulder all planes, elbow; w/gentle oscillations          Cane  AAROM PVC cane   Chest press 5x     Flexion

## 2024-04-19 ENCOUNTER — HOSPITAL ENCOUNTER (OUTPATIENT)
Dept: PHYSICAL THERAPY | Age: 64
Setting detail: THERAPIES SERIES
Discharge: HOME | End: 2024-04-19

## 2024-04-19 PROCEDURE — 97140 MANUAL THERAPY 1/> REGIONS: CPT

## 2024-04-19 PROCEDURE — 97110 THERAPEUTIC EXERCISES: CPT

## 2024-04-19 NOTE — FLOWSHEET NOTE
[x] [facility]  Outpatient Rehabilitation &  Therapy  [address]  [phone]  [phone]     Physical Therapy Daily Treatment Note    Date:  2024  Patient Name:  Amy Pacheco  \"Dannielle\"  :  1960  MRN: [id number]  Physician: Lorne Perez DO; Jason Gama DO                                     Insurance: PENDING MEDICAID, FA assistance pending   Medical Diagnosis: Dislocation of left shoulder joint S43.005A                    Rehab Codes: M25.512, M25.612, M79.622, M62.512, M62.522, M62.532, R29.3  Onset Date: 2024                    Next 's appt: 05/15/2024  Visit# / total visits: ; Recheck goals due at visit 10     Cancels/No Shows: 0/0    Subjective:    Pain:  [x] Yes  [] No Location:  L Shoulder   Pain Rating: (0-10 scale) 2/10  Pain altered Tx:  [] No  [x] Yes  Action: decreased some reps due to pain  Comments: Patient did exercises yesterday and shoulder felt funny that something was not right but settled down. Did work on the computer for 7 hours yesterday also and did take breaks. Shoulder did pop this morning and yesterday. Pain upon arrival 2/10.      Objective:  Modalities: Game ready L Shoulder, 34°, low pressure, seated in chair, at end of Rx for 15 minutes--HELD this date  hot packs (2 cervical) to bicep/tricep/deltoid and upper trap region to decrease tightness for 10 minutes after exercises supine, wedge under legs  Precautions:  Exercises:  Exercise Reps/ Time Weight/ Level Comments   Pulleys 15x  flexion   Codman's   10x ea  Ant/post, med/lat, cw, ccw; Pt reports feels like something is wrong in shoulder          Table slides / scoots 10x 5sec Flex; scaption; circles   Stool scoots 10x  abduction         Open close fingers      HEP this date   Fingertips to thumb     HEP this date   Elbow flex/ext     HEP this date   Pronation/supination    HEP this date               Supine             PROM 10 min  Shoulder all planes,

## 2024-04-22 ENCOUNTER — HOSPITAL ENCOUNTER (OUTPATIENT)
Dept: PHYSICAL THERAPY | Age: 64
Setting detail: THERAPIES SERIES
Discharge: HOME | End: 2024-04-22

## 2024-04-22 ENCOUNTER — OFFICE VISIT (OUTPATIENT)
Dept: FAMILY MEDICINE CLINIC | Age: 64
End: 2024-04-22
Payer: MEDICAID

## 2024-04-22 VITALS
DIASTOLIC BLOOD PRESSURE: 78 MMHG | TEMPERATURE: 97.4 F | BODY MASS INDEX: 26.46 KG/M2 | WEIGHT: 174.6 LBS | SYSTOLIC BLOOD PRESSURE: 118 MMHG | OXYGEN SATURATION: 91 % | HEART RATE: 62 BPM | HEIGHT: 68 IN

## 2024-04-22 DIAGNOSIS — Z12.11 SCREEN FOR COLON CANCER: ICD-10-CM

## 2024-04-22 DIAGNOSIS — H92.03 OTALGIA OF BOTH EARS: ICD-10-CM

## 2024-04-22 DIAGNOSIS — B37.0 ORAL CANDIDIASIS: Primary | ICD-10-CM

## 2024-04-22 DIAGNOSIS — R09.81 SINUS CONGESTION: ICD-10-CM

## 2024-04-22 PROCEDURE — 3078F DIAST BP <80 MM HG: CPT | Performed by: NURSE PRACTITIONER

## 2024-04-22 PROCEDURE — 97110 THERAPEUTIC EXERCISES: CPT

## 2024-04-22 PROCEDURE — 97140 MANUAL THERAPY 1/> REGIONS: CPT

## 2024-04-22 PROCEDURE — 3074F SYST BP LT 130 MM HG: CPT | Performed by: NURSE PRACTITIONER

## 2024-04-22 PROCEDURE — 99213 OFFICE O/P EST LOW 20 MIN: CPT | Performed by: NURSE PRACTITIONER

## 2024-04-22 RX ORDER — FLUCONAZOLE 100 MG/1
100 TABLET ORAL DAILY
Qty: 6 TABLET | Refills: 0 | Status: SHIPPED | OUTPATIENT
Start: 2024-04-22 | End: 2024-04-28

## 2024-04-22 RX ORDER — AMOXICILLIN 500 MG/1
500 CAPSULE ORAL 2 TIMES DAILY
Qty: 14 CAPSULE | Refills: 0 | Status: SHIPPED | OUTPATIENT
Start: 2024-04-22 | End: 2024-04-29

## 2024-04-22 ASSESSMENT — ENCOUNTER SYMPTOMS
SINUS PRESSURE: 1
CHEST TIGHTNESS: 0
SORE THROAT: 1
SHORTNESS OF BREATH: 0
ABDOMINAL PAIN: 0
TROUBLE SWALLOWING: 0
COUGH: 1
RHINORRHEA: 0
SINUS PAIN: 0

## 2024-04-22 NOTE — FLOWSHEET NOTE
[x] [facility]  Outpatient Rehabilitation &  Therapy  [address]  [phone]  [phone]     Physical Therapy Daily Treatment Note    Date:  2024  Patient Name:  Amy Pacheco  \"Dannielle\"  :  1960  MRN: [id number]  Physician: Lorne Perez DO; Jason Gama DO                                     Insurance: PENDING MEDICAID, FA assistance pending   Medical Diagnosis: Dislocation of left shoulder joint S43.005A                    Rehab Codes: M25.512, M25.612, M79.622, M62.512, M62.522, M62.532, R29.3  Onset Date: 2024                    Next 's appt: 05/15/2024  Visit# / total visits: ; Recheck goals due at visit 10     Cancels/No Shows: 0/0    Subjective:    Pain:  [x] Yes  [] No Location:  L Shoulder   Pain Rating: (0-10 scale) 2/10  Pain altered Tx:  [] No  [x] Yes  Action: decreased some reps due to pain  Comments: Patient states that she has been doing HEP at home twice a day. Educated patient to make sure she is not overdoing with increased pain.       Objective:  Modalities: Game ready L Shoulder, 34°, low pressure, seated in chair, at end of Rx for 15 minutes--HELD this date  hot packs (2 cervical) to bicep/tricep/deltoid and upper trap region to decrease tightness for 10 minutes after exercises supine, wedge under legs  Precautions:  Exercises:  Exercise Reps/ Time Weight/ Level Comments   Pulleys 15x  flexion   Codman's   DC  Ant/post, med/lat, cw, ccw; Pt reports feels like something is wrong in shoulder          Theraband isometrics  5x  Rows, ext, IR, ER   added               Table slides / scoots 10x 5sec Flex; scaption; circles   Stool scoots 10x  abduction         Open close fingers      HEP this date   Fingertips to thumb     HEP this date   Elbow flex/ext     HEP this date   Pronation/supination    HEP this date               Supine             PROM 10 min  Shoulder all planes, elbow; w/gentle oscillations          Cane  AAROM

## 2024-04-22 NOTE — PROGRESS NOTES
Visit Information    Have you changed or started any medications since your last visit including any over-the-counter medicines, vitamins, or herbal medicines? no   Have you stopped taking any of your medications? Is so, why? -  no  Are you having any side effects from any of your medications? - no    Have you seen any other physician or provider since your last visit?  no   Have you had any other diagnostic tests since your last visit?  no   Have you been seen in the emergency room and/or had an admission in a hospital since we last saw you?  no   Have you had your routine dental cleaning in the past 6 months?  no     Do you have an active MyChart account? If no, what is the barrier?  Yes    Patient Care Team:  Jaz Irving APRN - CNP as PCP - General (Family Nurse Practitioner)  Jaz Irving APRN - CNP as PCP - Empaneled Provider  Emma Gonzalez MD as Consulting Physician (Endocrinology)    Medical History Review  Past Medical, Family, and Social History reviewed and  contribute to the patient presenting condition    Health Maintenance   Topic Date Due    COVID-19 Vaccine (1) Never done    Hepatitis C screen  Never done    DTaP/Tdap/Td vaccine (1 - Tdap) Never done    Colorectal Cancer Screen  Never done    Shingles vaccine (1 of 2) Never done    Breast cancer screen  07/07/2017    Respiratory Syncytial Virus (RSV) Pregnant or age 60 yrs+ (1 - 1-dose 60+ series) Never done    Flu vaccine (Season Ended) 08/01/2024    Depression Screen  02/27/2025    Lipids  08/15/2028    HIV screen  Completed    Hepatitis A vaccine  Aged Out    Hepatitis B vaccine  Aged Out    Hib vaccine  Aged Out    Polio vaccine  Aged Out    Meningococcal (ACWY) vaccine  Aged Out    Pneumococcal 0-64 years Vaccine  Aged Out    Diabetes screen  Discontinued    Cervical cancer screen  Discontinued
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
## 2024-04-24 ENCOUNTER — HOSPITAL ENCOUNTER (OUTPATIENT)
Dept: PHYSICAL THERAPY | Age: 64
Setting detail: THERAPIES SERIES
Discharge: HOME | End: 2024-04-24

## 2024-04-24 PROCEDURE — 97140 MANUAL THERAPY 1/> REGIONS: CPT

## 2024-04-24 PROCEDURE — 97110 THERAPEUTIC EXERCISES: CPT

## 2024-04-24 NOTE — FLOWSHEET NOTE
Supine             PROM 10 min  Shoulder all planes, elbow; w/gentle oscillations          Cane  AAROM PVC cane   Chest press 5x     Flexion 5x     ER 5x     Horizontal abduction 5x     Other:    Manual: trigger point release to left upper trap, deltoid, and tricep to decrease tightness with good releases 8 minutes total    Treatment Charges: Mins Units   []  Modalities--HP     [x]  Ther Exercise 47 3   [x]  Manual Therapy 8 1   []  Ther Activities     []  Neuro Re-ed     []  Vasocompression     [] Gait     []  Other     Total Billable time 55 mins 4       Assessment: [x] Progressing toward goals. Added supine horizontal abduction with cane to increase shoulder ROM. Reviewed theraband isometrics as exercises are still new to patient. Did note patient with better shoulder ROM this date. Continued manual therapy to decrease shoulder tightness with good releases. Patient declined need for hot pack this date as she will do this at home.    [] No change in pain.     [] Other:    [x] Patient would continue to benefit from skilled physical therapy services in order to: decrease pain L shoulder, increase ROM L Shoulder, forearm, increase strength L UE, and improve computer use, ADLs, doing her hair, driving, household activities, standing tolerance, and work activities.         STG: (to be met in 10 treatments)  ? Pain: L Shoulder 4/10 average pain, 6/10 at worst   ? ROM: L Shoulder supine flex , abd 90°, ER 30°, IR 50° (@ 45° abd), pronation 50°, supination 75° to improve computer use, driving and ADLs   ? Strength: L Shoulder 3-/5, L elbow 3+/5, L  46 lbs, to improve driving, ADLs, and tolerance to standing  ? Function: UEFI 64% loss of UE function   Patient to be independent with home exercise program as demonstrated by performance with correct form without cues.     LTG: (to be met in 20 treatments)  ? Pain: L Shoulder 2/10 average pain, 4/10 at worst   ? ROM: L Shoulder supine flex, abd 120°, ER 50°, IR 60°

## 2024-04-26 ENCOUNTER — HOSPITAL ENCOUNTER (OUTPATIENT)
Dept: PHYSICAL THERAPY | Age: 64
Setting detail: THERAPIES SERIES
Discharge: HOME | End: 2024-04-26

## 2024-04-26 PROCEDURE — 97110 THERAPEUTIC EXERCISES: CPT

## 2024-04-26 PROCEDURE — 97140 MANUAL THERAPY 1/> REGIONS: CPT

## 2024-04-26 NOTE — FLOWSHEET NOTE
[x] [facility]  Outpatient Rehabilitation &  Therapy  [address]  [phone]  [phone]     Physical Therapy Daily Treatment Note    Date:  2024  Patient Name:  Amy Pacheco  \"Dannielle\"  :  1960  MRN: [id number]  Physician: Lorne Perez DO; Jason Gama DO                                     Insurance: PENDING MEDICAID, FA assistance pending   Medical Diagnosis: Dislocation of left shoulder joint S43.005A                    Rehab Codes: M25.512, M25.612, M79.622, M62.512, M62.522, M62.532, R29.3  Onset Date: 2024                    Next 's appt: 05/15/2024  Visit# / total visits: 15/20; Recheck goals due at visit 10     Cancels/No Shows: 0/0    Subjective:    Pain:  [x] Yes  [] No Location:  L Shoulder   Pain Rating: (0-10 scale) 2-3/10  Pain altered Tx:  [x] No  [] Yes  Action:   Comments: pain in right shoulder today and yesterday. Able to put coat on easier.       Objective:  Modalities: Game ready L Shoulder, 34°, low pressure, seated in chair, at end of Rx for 15 minutes--HELD this date  hot packs (2 cervical) to bicep/tricep/deltoid and upper trap region to decrease tightness for 10 minutes after exercises supine, wedge under legs--HELD this date  Precautions:  Exercises:  Exercise Reps/ Time Weight/ Level Comments   Pulleys  15x  flexion   Codman's   DC  Ant/post, med/lat, cw, ccw; Pt reports feels like something is wrong in shoulder          Theraband isometrics  5x blue Rows, ext, IR, ER                  Table slides / scoots 10x 5sec Flex; scaption; circles   Stool scoots 10x  abduction         Open close fingers      HEP this date   Fingertips to thumb     HEP this date   Elbow flex/ext     HEP this date   Pronation/supination    HEP this date               Supine             PROM 10 min  Shoulder all planes, elbow; w/gentle oscillations          Cane  AAROM PVC cane   Chest press 10x     Flexion 8x     ER 7x     Horizontal abduction

## 2024-04-29 ENCOUNTER — HOSPITAL ENCOUNTER (OUTPATIENT)
Dept: PHYSICAL THERAPY | Age: 64
Setting detail: THERAPIES SERIES
Discharge: HOME | End: 2024-04-29
Payer: MEDICAID

## 2024-04-29 NOTE — FLOWSHEET NOTE
loss of UE function---  Patient to be independent with home exercise program as demonstrated by performance with correct form without cues.--progress     LTG: (to be met in 20 treatments)  ? Pain: L Shoulder 2/10 average pain, 4/10 at worst   ? ROM: L Shoulder supine flex, abd 120°, ER 50°, IR 60° (@ 90° abd), pronation 65°,  to improve driving, ADLs, and household activities   ? Strength: L Shoulder 3+/5, L elbow 4/5, L  54 lbs to improve household and work activities   ? Function: UEFI 40% loss of UE function   Pt return to working normal amounts, driving distances w/out difficulty     Pt. Education:  [x] Yes  [] No  [] Reviewed Prior HEP/Ed  Method of Education: [x] Verbal  [x] Demo  [] Written  Comprehension of Education:  [x] Verbalizes understanding-purpose, correct technique ex, shoulder mechanics  [x] Demonstrates understanding.  Educated patient on correct technique for codmans exercises, needs review to make sure properly performed.  [x] Needs review.  [] Demonstrates/verbalizes HEP/Ed previously given.     Plan: [x] Continue current frequency toward long and short term goals.    2-3 x/week for 20 visits  Pt requests to begin only doing 2x per week due to cost, as she waits for her Medicaid to be approved   [x] Specific Instructions for subsequent treatments: add remaining supine cane exercises as able      Time In: 8:06 am           Time Out: 8:14 am    Electronically signed by:  ASHISH SHI PTA

## 2024-05-01 ENCOUNTER — HOSPITAL ENCOUNTER (OUTPATIENT)
Dept: PHYSICAL THERAPY | Age: 64
Setting detail: THERAPIES SERIES
Discharge: HOME | End: 2024-05-01

## 2024-05-01 NOTE — FLOWSHEET NOTE
[x] [facility]  Outpatient Rehabilitation &  Therapy  [address]  [phone]  [phone]     Therapy Cancel/No Show note    Date: 2024  Patient: Amy Pacheco  : 1960  MRN: [id number]    Cancels/No Shows to date:   ---- this cancel not counted against patient    For today's appointment patient:    [x]  Cancelled    [] Rescheduled appointment    [] No-show     Reason given by patient:    []  Patient ill    []  Conflicting appointment    [] No transportation      [] Conflict with work    [] No reason given    [] Weather related    [] COVID-19    [x] Other:  transferring to another clinic   Comments:        [] Next appointment was confirmed    Electronically signed by: ASHISH SHI PTA

## 2024-05-03 ENCOUNTER — APPOINTMENT (OUTPATIENT)
Dept: PHYSICAL THERAPY | Age: 64
End: 2024-05-03
Payer: MEDICAID

## 2024-05-06 ENCOUNTER — HOSPITAL ENCOUNTER (OUTPATIENT)
Dept: PHYSICAL THERAPY | Facility: CLINIC | Age: 64
Setting detail: THERAPIES SERIES
Discharge: HOME | End: 2024-05-06
Payer: MEDICAID

## 2024-05-06 ENCOUNTER — TELEPHONE (OUTPATIENT)
Dept: FAMILY MEDICINE CLINIC | Age: 64
End: 2024-05-06

## 2024-05-06 ENCOUNTER — APPOINTMENT (OUTPATIENT)
Dept: PHYSICAL THERAPY | Age: 64
End: 2024-05-06
Payer: MEDICAID

## 2024-05-06 DIAGNOSIS — B37.0 ORAL CANDIDIASIS: ICD-10-CM

## 2024-05-06 PROCEDURE — 97110 THERAPEUTIC EXERCISES: CPT

## 2024-05-06 PROCEDURE — 97140 MANUAL THERAPY 1/> REGIONS: CPT

## 2024-05-06 RX ORDER — FLUCONAZOLE 100 MG/1
200 TABLET ORAL DAILY
Qty: 10 TABLET | Refills: 0 | Status: SHIPPED | OUTPATIENT
Start: 2024-05-06 | End: 2024-05-11

## 2024-05-06 NOTE — TELEPHONE ENCOUNTER
Patient called stating that she has fungus on her tongue and it hasn't gone away. Patient states she still has lesions on the back of her tongue and burning on the roof of her mouth. Patient is wondering if there is something that can be prescribed?   No

## 2024-05-06 NOTE — TELEPHONE ENCOUNTER
Is the toe fungus on her nail or rash on foot? Also will send more diflucan for longer taper for mouth concern

## 2024-05-06 NOTE — FLOWSHEET NOTE
[x] [facility]  Outpatient Rehabilitation &  Therapy  [address]  [phone]  [phone] [] [facility]  Outpatient Rehabilitation &  Therapy  [address]  P: [phone]  F: [phone] [] [facility]  Outpatient Rehabilitation &  Therapy  [address]  P: [phone]  F: [phone] [] [facility]  Outpatient Rehabilitation &  Therapy  [address]  [phone]  [phone] [x] [facility]  Outpatient Rehabilitation &  Therapy  [address]   P: [phone]  F: [phone]  [] [facility]  Outpatient Rehabilitation &  Therapy  [address]  P: [phone]  F: [phone] [] [facility]  Outpatient Rehabilitation &  Therapy  [address]  P: [phone]  F: [phone] [] [facility]  Outpatient Rehabilitation &  Therapy  [address]  P: [phone]  F: [phone] [] [facility]  Outpatient Rehabilitation &  Therapy  [address]  P: [phone]  F: [phone]  [] [facility] Outpatient Rehabilitation &  Therapy  [address]  P: [phone]  F: [phone]     THERAPY RESPONSIBILITY OF CARE TRANSFER FORM       PATIENT NAME: Amy Pacheco  MRN: [id number]   : 1960      TRANSFERRING FACILITY:    [] [place]   [] [facility]   [] [facility]   [] [facility]   [] [facility] [] [place]   [x] [facility]  [] [facility]  [] [facility]   [] [place]  [] [place]   [] Other:          ACCEPTING FACILITY  [] [place]   [] [facility]   [] [facility]   [] [facility]   [] [facility] [x] [place]   [] [facility]  [] [facility]  [] [facility]   [] [place]  [] [place]   [] Other:         REASON FOR TRANSFER: Pt [place]       TRANSFER 
subocciput/25  IR: To ipsilateral gluteals     Treatment Charges: Mins Units   []  Modalities--HP     [x]  Ther Exercise 45 3   [x]  Manual Therapy 10 1   []  Ther Activities     []  Neuro Re-ed     []  Vasocompression     [] Gait     []  Other     Total Billable time 55 4       Assessment: [x] Progressing toward goals. Patient arrives for her first visit since she was transferred from [facility] to the [facility]. She reports gradual improvement, with the ability to complete more of her ADL's with use of her LUE. Patient noted to have functional flexion PROM, though decreased active ROM as well as passive IR/ER rotation. Today, we progressed to more AAROM and reviewed current home program. Addressed muscle stiffness with MFR. To begin light strengthening and progress isometrics to isotonics next session.     [] No change      [] Other:    [x] Patient would continue to benefit from skilled physical therapy services in order to: decrease pain L shoulder, increase ROM L Shoulder, forearm, increase strength L UE, and improve computer use, ADLs, doing her hair, driving, household activities, standing tolerance, and work activities.         STG: (to be met in 10 treatments) - assessed on 5/6/24   ? Pain: L Shoulder 4/10 average pain, 6/10 at worst--average pain 1-2/10, worst 3/10   ? ROM: L Shoulder supine flex , abd 90°, ER 30°, IR 50° (@ 45° abd), pronation 50°, supination 75° to improve computer use, driving and ADLs---  PROM 25 ER , 145 flexion, 110 abduction, active ROM in objective section above   ? Strength: L Shoulder 3-/5, L elbow 3+/5, L  46 lbs, to improve driving, ADLs, and tolerance to standing--- left elbow 4+/5, to test shoulder strength at a future visit   ? Function: UEFI 64% loss of UE function---   Patient to be independent with home exercise program as demonstrated by performance with correct form without cues.--progress     LTG: (to be met in 20 treatments) to increased LTG to be met in

## 2024-05-06 NOTE — TELEPHONE ENCOUNTER
Pt stated nothing wrong with the foot only the mouth and pt stated thank you for sending for mouth

## 2024-05-08 ENCOUNTER — HOSPITAL ENCOUNTER (OUTPATIENT)
Dept: PHYSICAL THERAPY | Facility: CLINIC | Age: 64
Setting detail: THERAPIES SERIES
Discharge: HOME | End: 2024-05-08
Payer: MEDICAID

## 2024-05-08 ENCOUNTER — APPOINTMENT (OUTPATIENT)
Dept: PHYSICAL THERAPY | Age: 64
End: 2024-05-08
Payer: MEDICAID

## 2024-05-08 PROCEDURE — 97110 THERAPEUTIC EXERCISES: CPT

## 2024-05-08 NOTE — FLOWSHEET NOTE
[x] [facility]  Outpatient Rehabilitation &  Therapy  [address]  [phone]  [phone]     Physical Therapy Daily Treatment Note    Date:  2024  Patient Name:  Amy Pacheco  \"Dannielle\"    :  1960  MRN: [id number]  Physician: Lorne Perez DO; Jason Gama DO                                     Insurance: PENDING MEDICAID, FA assistance pending   Medical Diagnosis: Dislocation of left shoulder joint S43.005A                    Rehab Codes: M25.512, M25.612, M79.622, M62.512, M62.522, M62.532, R29.3  Onset Date: 2024                    Next 's appt: 05/15/2024  Visit# / total visits:    Cancels/No Shows: 0/0    Subjective:    Pain:  [x] Yes  [] No Location:  L Shoulder   Pain Rating: (0-10 scale) 0/10  Pain altered Tx:  [x] No  [] Yes  Action:   Comments: Patient states soreness from last visit but her shoulder is getting better.         Objective:  Modalities:  Precautions: hx of dislocation  Exercises:  Exercise    LUE shoulder  Glenoid fx Reps/ Time Weight/ Level Comments   Pulleys  2'/2' Flex/abd          Wall slides  10x10\" Flex/scap HEP   Doorway pec stretch  3x30\" LUE    Golf club ER stretch  3x30\"  HEP         Chest press x10  1# wand  HEP   Flexion x10  1# wand  HEP   Horizontal abduction x10 1# wand  HEP         TBand rows  x15 [place] HEP   TBand extension  x15 [place] HEP   TBand ER x15 [place] HEP   TBand IR x15 [place] HEP         Other:    Manual: inferior GH glides to improve abduction       Treatment Charges: Mins Units   []  Modalities-     [x]  Ther Exercise 41 3   [x]  Manual Therapy 5 0   []  Ther Activities     []  Neuro Re-ed     []  Vasocompression     [] Gait     []  Other     Total Billable time 46 3       Assessment: [x] Progressing toward goals. Focused on shoulder flexibility and strengthening this date. Added UE resistance bands to HEP to improve strength. Ended with manual to improve GH joint mobility, patient

## 2024-05-10 ENCOUNTER — HOSPITAL ENCOUNTER (OUTPATIENT)
Age: 64
Discharge: HOME | End: 2024-05-10
Payer: MEDICAID

## 2024-05-10 ENCOUNTER — APPOINTMENT (OUTPATIENT)
Dept: PHYSICAL THERAPY | Facility: CLINIC | Age: 64
End: 2024-05-10
Payer: MEDICAID

## 2024-05-10 ENCOUNTER — TELEMEDICINE (OUTPATIENT)
Dept: FAMILY MEDICINE CLINIC | Age: 64
End: 2024-05-10
Payer: MEDICAID

## 2024-05-10 ENCOUNTER — HOSPITAL ENCOUNTER (OUTPATIENT)
Age: 64
Setting detail: SPECIMEN
Discharge: HOME | End: 2024-05-10

## 2024-05-10 ENCOUNTER — APPOINTMENT (OUTPATIENT)
Dept: PHYSICAL THERAPY | Age: 64
End: 2024-05-10
Payer: MEDICAID

## 2024-05-10 DIAGNOSIS — J39.2 LESION OF THROAT: Primary | ICD-10-CM

## 2024-05-10 DIAGNOSIS — K14.6 TONGUE PAIN: ICD-10-CM

## 2024-05-10 DIAGNOSIS — J39.2 LESION OF THROAT: ICD-10-CM

## 2024-05-10 LAB
25(OH)D3 SERPL-MCNC: 34.4 NG/ML (ref 30–100)
BASOPHILS # BLD: 0.07 K/UL (ref 0–0.2)
BASOPHILS NFR BLD: 1 % (ref 0–2)
EOSINOPHIL # BLD: 0.18 K/UL (ref 0–0.44)
EOSINOPHILS RELATIVE PERCENT: 3 % (ref 1–4)
ERYTHROCYTE [DISTWIDTH] IN BLOOD BY AUTOMATED COUNT: 12.9 % (ref 11.8–14.4)
HCT VFR BLD AUTO: 41.5 % (ref 36.3–47.1)
HGB BLD-MCNC: 13.6 G/DL (ref 11.9–15.1)
IMM GRANULOCYTES # BLD AUTO: <0.03 K/UL (ref 0–0.3)
IMM GRANULOCYTES NFR BLD: 0 %
LYMPHOCYTES NFR BLD: 2.34 K/UL (ref 1.1–3.7)
LYMPHOCYTES RELATIVE PERCENT: 38 % (ref 24–43)
MCH RBC QN AUTO: 32 PG (ref 25.2–33.5)
MCHC RBC AUTO-ENTMCNC: 32.8 G/DL (ref 28.4–34.8)
MCV RBC AUTO: 97.6 FL (ref 82.6–102.9)
MONOCYTES NFR BLD: 0.47 K/UL (ref 0.1–1.2)
MONOCYTES NFR BLD: 8 % (ref 3–12)
NEUTROPHILS NFR BLD: 50 % (ref 36–65)
NEUTS SEG NFR BLD: 3.06 K/UL (ref 1.5–8.1)
NRBC BLD-RTO: 0 PER 100 WBC
PLATELET # BLD AUTO: 228 K/UL (ref 138–453)
PMV BLD AUTO: 11.7 FL (ref 8.1–13.5)
RBC # BLD AUTO: 4.25 M/UL (ref 3.95–5.11)
TSH SERPL DL<=0.05 MIU/L-ACNC: 0.54 UIU/ML (ref 0.27–4.2)
VIT B12 SERPL-MCNC: 400 PG/ML (ref 232–1245)
WBC OTHER # BLD: 6.1 K/UL (ref 3.5–11.3)

## 2024-05-10 PROCEDURE — 82306 VITAMIN D 25 HYDROXY: CPT

## 2024-05-10 PROCEDURE — 82607 VITAMIN B-12: CPT

## 2024-05-10 PROCEDURE — 84443 ASSAY THYROID STIM HORMONE: CPT

## 2024-05-10 PROCEDURE — 86787 VARICELLA-ZOSTER ANTIBODY: CPT

## 2024-05-10 PROCEDURE — 86225 DNA ANTIBODY NATIVE: CPT

## 2024-05-10 PROCEDURE — 36415 COLL VENOUS BLD VENIPUNCTURE: CPT

## 2024-05-10 PROCEDURE — 86038 ANTINUCLEAR ANTIBODIES: CPT

## 2024-05-10 PROCEDURE — 85025 COMPLETE CBC W/AUTO DIFF WBC: CPT

## 2024-05-10 PROCEDURE — 99213 OFFICE O/P EST LOW 20 MIN: CPT | Performed by: NURSE PRACTITIONER

## 2024-05-10 NOTE — PROGRESS NOTES
Visit Information    Have you changed or started any medications since your last visit including any over-the-counter medicines, vitamins, or herbal medicines? no   Have you stopped taking any of your medications? Is so, why? -  no  Are you having any side effects from any of your medications? - no    Have you seen any other physician or provider since your last visit?  no   Have you had any other diagnostic tests since your last visit?  no   Have you been seen in the emergency room and/or had an admission in a hospital since we last saw you?  no   Have you had your routine dental cleaning in the past 6 months?  no     Do you have an active MyChart account? If no, what is the barrier?  Yes    Patient Care Team:  Jza Irving APRN - CNP as PCP - General (Family Nurse Practitioner)  Jaz Irving APRN - CNP as PCP - Empaneled Provider  Emma Gonzalez MD as Consulting Physician (Endocrinology)    Medical History Review  Past Medical, Family, and Social History reviewed and  contribute to the patient presenting condition    Health Maintenance   Topic Date Due    COVID-19 Vaccine (1) Never done    Hepatitis C screen  Never done    Colorectal Cancer Screen  Never done    Shingles vaccine (1 of 2) Never done    Breast cancer screen  07/07/2017    Respiratory Syncytial Virus (RSV) Pregnant or age 60 yrs+ (1 - 1-dose 60+ series) Never done    DTaP/Tdap/Td vaccine (1 - Tdap) 05/13/2024 (Originally 12/30/1979)    Flu vaccine (Season Ended) 08/01/2024    Depression Screen  02/27/2025    Lipids  08/15/2028    HIV screen  Completed    Hepatitis A vaccine  Aged Out    Hepatitis B vaccine  Aged Out    Hib vaccine  Aged Out    Polio vaccine  Aged Out    Meningococcal (ACWY) vaccine  Aged Out    Pneumococcal 0-64 years Vaccine  Aged Out    Diabetes screen  Discontinued    Cervical cancer screen  Discontinued

## 2024-05-10 NOTE — PROGRESS NOTES
Amy Pacheco, was evaluated through a synchronous (real-time) audio  encounter. The patient (or guardian if applicable) is aware that this is a billable service, which includes applicable co-pays. This Virtual Visit was conducted with patient's (and/or legal guardian's) consent. Patient identification was verified, and a caregiver was present when appropriate.   The patient was located at Home: [address] [place] [address] [address]  [facility] [address]  Provider was located at Home (Appt Dept State): [place]  Confirm you are appropriately licensed, registered, or certified to deliver care in the state where the patient is located as indicated above. If you are not or unsure, please re-schedule the visit: Yes, I confirm.     Amy Pacheco (:  1960) is a Established patient, presenting virtually for evaluation of the following:    Assessment & Plan   Below is the assessment and plan developed based on review of pertinent history, physical exam, labs, studies, and medications.  1. Lesion of throat  Ongoing issue, she originally thought it was r/t her parital denture but  now not sure, failed 3 courses of diflucan /liq. Nystatin, thinking it was yeast, 1 course of amoxicillin  Will refer to ENT for possible biopsy  Check new throat cultures in office today and check labs for other possible causes    -     External Referral To ENT  -     VARUN Screen with Reflex; Future  -     CBC with Auto Differential; Future  -     Vitamin D 25 Hydroxy; Future  -     Vitamin B12; Future  -     TSH; Future  -     Strep A culture, throat  -     Varicella Zoster,IgM; Future  -     Culture, Throat; Future  -     Culture, HSV; Future  2. Tongue pain  See #1    -     External Referral To ENT  -     VARUN Screen with Reflex; Future  -     Vitamin D 25 Hydroxy; Future  -     Vitamin B12; Future  -     TSH; Future    No follow-ups on file.       Subjective   HPI  Patient calling in today for evaluation of ongoing tongue pain and throat lesions.

## 2024-05-11 LAB
MICROORGANISM SPEC CULT: NORMAL
SPECIMEN DESCRIPTION: NORMAL

## 2024-05-12 LAB
MICROORGANISM SPEC CULT: NORMAL
SPECIMEN DESCRIPTION: NORMAL

## 2024-05-14 ENCOUNTER — HOSPITAL ENCOUNTER (OUTPATIENT)
Dept: PHYSICAL THERAPY | Facility: CLINIC | Age: 64
Setting detail: THERAPIES SERIES
Discharge: HOME | End: 2024-05-14
Payer: MEDICAID

## 2024-05-14 LAB
ANA SER QL IA: NEGATIVE
DSDNA IGG SER QL IA: 1.2 IU/ML
NUCLEAR IGG SER IA-RTO: 0.3 U/ML
VZV IGM SER IA-ACNC: 0.15 ISR

## 2024-05-14 PROCEDURE — 97110 THERAPEUTIC EXERCISES: CPT

## 2024-05-14 PROCEDURE — 97140 MANUAL THERAPY 1/> REGIONS: CPT

## 2024-05-14 NOTE — FLOWSHEET NOTE
[x] [facility]  Outpatient Rehabilitation &  Therapy  [address]  [phone]  [phone]     Physical Therapy Daily Treatment Note    Date:  2024  Patient Name:  Amy Pacheco  \"Dannielle\"    :  1960  MRN: [id number]  Physician: Lorne Perez DO; Jason Gama DO                                     Insurance: PENDING MEDICAID, FA assistance pending   Medical Diagnosis: Dislocation of left shoulder joint S43.005A                    Rehab Codes: M25.512, M25.612, M79.622, M62.512, M62.522, M62.532, R29.3  Onset Date: 2024                    Next 's appt: 05/15/2024  Visit# / total visits:    Cancels/No Shows: 0/0    Subjective:    Pain:  [x] Yes  [] No Location:  L Shoulder   Pain Rating: (0-10 scale) 0/10  Pain altered Tx:  [x] No  [] Yes  Action:   Comments: Patient states that she had to take her Meloxicam last week due to soreness. She took it today prior to her arrival. Does continue to not improvements in her every day functioning.     Objective:  Modalities:  Precautions: hx of dislocation  Exercises:  Exercise    LUE shoulder  Glenoid fx Reps/ Time Weight/ Level Comments   Pulleys  2'/2' Flex/abd          Wall slides  10x10\" Flex/scap HEP   Doorway pec stretch  3x30\" LUE    Golf club ER stretch  10x10\"  HEP         Chest press x10  1# wand  HEP   Flexion x20  1# wand  HEP   Horizontal abduction x20 1# wand  HEP   Wand assissed ER  x10     Sleeper stretch  3x30\"  Added /          TBand rows  x15 [place] HEP   TBand extension  x17 [place] HEP   TBand ER x15 [place] HEP   TBand IR x15 [place] HEP         Other:    Manual: inferior GH glides to improve abduction   Posterior glides of the GH joints to improve IR  - Followed glides with passive ROM, AAROM, and then agonist/antagonist/agonist contraction for neuro re-education     Manual MFR to the latissimus dorsi with passive shoulder flexion throughout       Treatment Charges: Mins Units   []

## 2024-05-15 ENCOUNTER — OFFICE VISIT (OUTPATIENT)
Dept: ORTHOPEDIC SURGERY | Age: 64
End: 2024-05-15
Payer: MEDICAID

## 2024-05-15 ENCOUNTER — APPOINTMENT (OUTPATIENT)
Dept: PHYSICAL THERAPY | Facility: CLINIC | Age: 64
End: 2024-05-15
Payer: MEDICAID

## 2024-05-15 VITALS — WEIGHT: 174 LBS | BODY MASS INDEX: 26.37 KG/M2 | HEIGHT: 68 IN

## 2024-05-15 DIAGNOSIS — S43.005A DISLOCATION OF LEFT SHOULDER JOINT, INITIAL ENCOUNTER: Primary | ICD-10-CM

## 2024-05-15 DIAGNOSIS — M21.822 HILL SACHS DEFORMITY, LEFT: ICD-10-CM

## 2024-05-15 DIAGNOSIS — S43.492D BANKART LESION OF LEFT SHOULDER, SUBSEQUENT ENCOUNTER: ICD-10-CM

## 2024-05-15 DIAGNOSIS — S46.012D TRAUMATIC INCOMPLETE TEAR OF LEFT ROTATOR CUFF, SUBSEQUENT ENCOUNTER: ICD-10-CM

## 2024-05-15 PROCEDURE — 99213 OFFICE O/P EST LOW 20 MIN: CPT | Performed by: STUDENT IN AN ORGANIZED HEALTH CARE EDUCATION/TRAINING PROGRAM

## 2024-05-15 NOTE — PROGRESS NOTES
[facility]  [address]  [place] [address]  Dept: [phone]  Dept Fax: [phone]        Ambulatory Follow Up    Subjective:   Amy Pacheco is a 63 y.o. year old female who presents to our office today for routine followup regarding her left shoulder injury, which was sustained on 2/17/2024.  Of note, the patient has previously obtained an MRI which demonstrated a small Hill-Sachs lesion, bony Bankart, and partial articular sided rotator cuff tear.    She was last seen in the office on 3/13/2024.  She was prescribed physical therapy.  The patient presents today, 3 months post injury.  She is doing very well.  She has progressed significantly with regards to pain and range of motion.  She is happy with her results.  She wishes to continue with physical therapy and conservative management. Her left shoulder pain is 1/10 today.    Chief Complaint   Patient presents with    Follow-up     Lt shoulder pain       HPI    Review of Systems   Constitutional: Negative for fever and chills.   HENT: Negative for congestion.    Eyes: Negative for blurred vision and double vision.   Respiratory: Negative for cough, shortness of breath and wheezing.    Cardiovascular: Negative for chest pain and palpitations.   Gastrointestinal: Negative for nausea. Negative for vomiting.   Musculoskeletal: Positive for left shoulder pain  Skin: Negative for itching and rash.   Neurological: Negative for dizziness, sensory change and headaches.   Psychiatric/Behavioral: Negative for depression and suicidal ideas.       Objective :   General: AAOx3, NAD, appears stated age  Ortho Exam  LUE: Skin intact.  Shoulder forward flexion 0 to 110 degrees, abduction 0 to 80 degrees, external rotation 0 to 30 degrees, internal rotation to left back pocket.  Positive Speed test.  Positive Vinton's test, positive crank test, positive load-and-shift test.  Negative drop

## 2024-05-17 ENCOUNTER — HOSPITAL ENCOUNTER (OUTPATIENT)
Dept: PHYSICAL THERAPY | Facility: CLINIC | Age: 64
Setting detail: THERAPIES SERIES
Discharge: HOME | End: 2024-05-17
Payer: MEDICAID

## 2024-05-17 PROCEDURE — 97140 MANUAL THERAPY 1/> REGIONS: CPT

## 2024-05-17 PROCEDURE — 97110 THERAPEUTIC EXERCISES: CPT

## 2024-05-17 NOTE — FLOWSHEET NOTE
[x] [facility]  Outpatient Rehabilitation &  Therapy  [address]  [phone]  [phone]     Physical Therapy Daily Treatment Note    Date:  2024  Patient Name:  Amy Pacheco  \"Dannielle\"    :  1960  MRN: [id number]  Physician: Lorne Perez DO; Jason Gama DO                                     Insurance: PENDING MEDICAID, FA assistance pending   Medical Diagnosis: Dislocation of left shoulder joint S43.005A                    Rehab Codes: M25.512, M25.612, M79.622, M62.512, M62.522, M62.532, R29.3  Onset Date: 2024                    Next 's appt: 2024  Visit# / total visits:    Cancels/No Shows: 0/0    Subjective:    Pain:  [x] Yes  [] No Location:  L Shoulder   Pain Rating: (0-10 scale) 1/10  Pain altered Tx:  [x] No  [] Yes  Action:   Comments: Patient states continued improvements, she finds herself reaching out for things able to extend her arm farther.     Objective:  Modalities:  Precautions: hx of dislocation  Exercises:  Exercise    LUE shoulder  Glenoid fx Reps/ Time Weight/ Level Comments   Pulleys  2'/2' Flex/abd          Wall slides  10x10\" Flex/scap HEP   Doorway pec stretch  3x30\" LUE    Golf club ER stretch  10x10\"  HEP         Chest press HEP 1# wand  HEP   Flexion 20x5\" 1# wand  HEP   Horizontal abduction x20 1# wand  HEP         Sleeper stretch  3x30\"  Added           Sidelying ER                   TBand rows  x20 [place] HEP   TBand extension  x20 [place] HEP   ER reactive isometrics  x10 [place]     TBand ER  x15 [place] HEP   TBand IR  x15 [place] HEP         Other:    Manual: inferior GH glides to improve abduction   Posterior glides of the GH joints to improve IR  - Followed inferior glides with passive ROM, AAROM, and then agonist/antagonist/agonist contraction for neuro re-education     Manual MFR to the latissimus dorsi with passive shoulder flexion throughout       Treatment Charges: Mins Units   []

## 2024-05-21 ENCOUNTER — HOSPITAL ENCOUNTER (OUTPATIENT)
Dept: PHYSICAL THERAPY | Facility: CLINIC | Age: 64
Setting detail: THERAPIES SERIES
Discharge: HOME | End: 2024-05-21
Payer: MEDICAID

## 2024-05-21 PROCEDURE — 97140 MANUAL THERAPY 1/> REGIONS: CPT

## 2024-05-21 PROCEDURE — 97110 THERAPEUTIC EXERCISES: CPT

## 2024-05-21 NOTE — FLOWSHEET NOTE
[x] [facility]  Outpatient Rehabilitation &  Therapy  [address]  [phone]  [phone]     Physical Therapy Daily Treatment Note    Date:  2024  Patient Name:  Amy Pacheco  \"Dannielle\"    :  1960  MRN: [id number]  Physician: Lorne Perez DO; Jason Gama DO                                     Insurance: PENDING MEDICAID, FA assistance pending   Medical Diagnosis: Dislocation of left shoulder joint S43.005A                    Rehab Codes: M25.512, M25.612, M79.622, M62.512, M62.522, M62.532, R29.3  Onset Date: 2024                    Next 's appt: 2024  Visit# / total visits:    Cancels/No Shows: 0/0    Subjective:    Pain:  [x] Yes  [] No Location:  L Shoulder   Pain Rating: (0-10 scale) 2/10  Pain altered Tx:  [x] No  [] Yes  Action:   Comments: Patient states continued improvements. Only mild pain on arrival. Was working on her exercises over the weekend.     Objective:  Modalities:  Precautions: hx of dislocation  Exercises:  Exercise    LUE shoulder  Glenoid fx Reps/ Time Weight/ Level Comments   Pulleys  2'/2' Flex/abd          Wall slides  10x10\" Flex/scap HEP   Doorway pec stretch  3x30\" LUE    Golf club ER stretch  HEP  HEP         Chest press HEP 1# wand  HEP   Flexion 20x5\" 1# wand  HEP   Horizontal abduction x10 1# wand  HEP   Abduction x2 1# wand  Difficult for the patient, attempt in standing next          Sleeper stretch  3x30\"  Added 5/          Sidelying ER  x20 A Added /    Posterior capsule stretch  8x10\"           TBand rows  x20 [place]  HEP   TBand extension  x20 [place] HEP   ER reactive isometrics  x10 [place]     TBand ER  x18 [place] HEP   TBand IR  x18 [place] HEP         Other:    Manual: inferior GH glides to improve abduction   - Followed inferior glides with passive ROM, AAROM, and then agonist/antagonist/agonist contraction for neuro re-education     Manual MFR to the latissimus dorsi with passive shoulder

## 2024-05-24 ENCOUNTER — HOSPITAL ENCOUNTER (OUTPATIENT)
Dept: PHYSICAL THERAPY | Facility: CLINIC | Age: 64
Setting detail: THERAPIES SERIES
Discharge: HOME | End: 2024-05-24
Payer: MEDICAID

## 2024-05-24 PROCEDURE — 97110 THERAPEUTIC EXERCISES: CPT

## 2024-05-24 PROCEDURE — 97140 MANUAL THERAPY 1/> REGIONS: CPT

## 2024-05-24 NOTE — FLOWSHEET NOTE
Resistance  - 1 x daily - 7 x weekly - 2 sets - 10 reps  - Shoulder extension with resistance - Neutral  - 1 x daily - 7 x weekly - 2 sets - 10 reps  - Shoulder External Rotation with Anchored Resistance  - 1 x daily - 7 x weekly - 2 sets - 10 reps  - Shoulder Internal Rotation with Resistance  - 1 x daily - 7 x weekly - 2 sets - 10 reps    Comprehension of Education:  [x] Verbalizes understanding  [x] Demonstrates understanding.  [x] Needs review.  [x] Demonstrates/verbalizes HEP/Ed previously given.     Plan: [x] Continue current frequency toward long and short term goals.      Time In: 7:04 am           Time Out:  8:13am      Electronically signed by:  JANUARY LOMAX, SPT  This Documentation has been reviewed by Jason Rios PT

## 2024-05-30 ENCOUNTER — HOSPITAL ENCOUNTER (OUTPATIENT)
Dept: PHYSICAL THERAPY | Facility: CLINIC | Age: 64
Setting detail: THERAPIES SERIES
Discharge: HOME | End: 2024-05-30
Payer: MEDICAID

## 2024-05-30 PROCEDURE — 97110 THERAPEUTIC EXERCISES: CPT

## 2024-05-30 NOTE — FLOWSHEET NOTE
[x] [facility]  Outpatient Rehabilitation &  Therapy  [address]  [phone]  [phone]     Physical Therapy Daily Treatment Note    Date:  2024  Patient Name:  Amy Pacheco  \"Dannielle\"    :  1960  MRN: [id number]  Physician: Lorne Perez DO; Jason Gama DO                                     Insurance: PENDING MEDICAID, FA assistance pending   Medical Diagnosis: Dislocation of left shoulder joint S43.005A                    Rehab Codes: M25.512, M25.612, M79.622, M62.512, M62.522, M62.532, R29.3  Onset Date: 2024                    Next 's appt: 2024    Visit# / total visits:    Cancels/No Shows: 0/0      Subjective:    Pain:  [x] Yes  [] No Location:  L Shoulder   Pain Rating: (0-10 scale) 2/10  Pain altered Tx:  [x] No  [] Yes  Action:   Comments: Patient arrived noting minor soreness but notes continued improvements in ROM.    Objective:    Precautions: hx of dislocation  Exercise    LUE shoulder  Glenoid fx Reps/ Time Weight/ Level Comments Completed Today   Pulleys  2'/2' Flex/abd  x          Wall slides  10x10\" Flex/scap HEP x   Doorway pec stretch  3x30\" LUE  x   Golf club ER stretch  HEP  HEP           Chest press HEP 1# wand  HEP x   Flexion 20x5\" 1# wand  HEP x   Horizontal abduction x10 1# wand  HEP    Abduction x2 1# wand             Sleeper stretch  3x30\"             Sidelying ER  x15 A  x   Posterior capsule stretch  8x10\"             Rows  x20 [place]  HEP x   Extension  x20 [place] HEP x   ER x20 [place]   x   IR  x20 [place] HEP x          Other:    Myofascial Restrictions  Location  R/L Treatment  Tools Notes   Upper Crossed    [x] Upper Trap [] Right  [x] Left [x] Direct  [] Indirect [x] Hands-On  [] IASTM  [] Hypervolt     [] Scalenes [] Right  [] Left [] Direct  [] Indirect [] Hands-On  [] IASTM  [] Hypervolt     [] Levator Scapula [] Right  [] Left [] Direct  [] Indirect [] Hands-On  [] IASTM  [] Hypervolt     [] Pec

## 2024-06-06 ENCOUNTER — HOSPITAL ENCOUNTER (OUTPATIENT)
Dept: PHYSICAL THERAPY | Facility: CLINIC | Age: 64
Setting detail: THERAPIES SERIES
Discharge: HOME | End: 2024-06-06
Payer: MEDICAID

## 2024-06-06 PROCEDURE — 97110 THERAPEUTIC EXERCISES: CPT

## 2024-06-06 NOTE — FLOWSHEET NOTE
[x] [facility]  Outpatient Rehabilitation &  Therapy  [address] [place] [address]  [phone]  [phone]     Physical Therapy Daily Treatment Note    Date:  2024  Patient Name:  Amy Pacheco  \"Dannielle\"    :  1960  MRN: [id number]  Physician: Lorne Perez DO; Jason Gama DO                                     Insurance: PENDING MEDICAID, FA assistance pending   Medical Diagnosis: Dislocation of left shoulder joint S43.005A                    Rehab Codes: M25.512, M25.612, M79.622, M62.512, M62.522, M62.532, R29.3  Onset Date: 2024                    Next 's appt: 2024    Visit# / total visits:    Cancels/No Shows: 0/1      Subjective:    Pain:  [x] Yes  [] No Location:  L Shoulder   Pain Rating: (0-10 scale) 1/10  Pain altered Tx:  [x] No  [] Yes  Action:   Comments: Patient arrived stating improved symptoms and mild pain upon arrival. Compliant with HEP. Intermittent popping with exercises and movement. Continued difficulty with abduction and scaption motions.       Objective:  Precautions: hx of dislocation  Exercise    LUE shoulder  Glenoid fx Reps/ Time Weight/ Level Comments Completed Today   Pulleys  2'/2' Flex/abd  x          At wall        Wall slides flexion 10x10\"  HEP x   Wall slides scaption 10x10\"  HEP x   Wall slides abduction  7x10\"  Difficult  x   Wand abduction  8x10\"   x          Doorway pec stretch  3x30\" LUE  x   Posterior capsule stretch 3x30\"   x          Mat       SA punches next 0#     Chest press x20 1# bar HEP    Wand Flexion 20x5\" 1# bar HEP    Sleeper stretch  3x30\"      Sidelying ER  x15 0#            Gym       Shoulder flexion  x10 0#  x   Shoulder scaption  x10 0#  x   Shoulder abduction  x10 0#  x          Rows  x20 [place]  HEP x   Extension  x20 [place] HEP x   ER x20 [place]   x   IR  x20 [place] HEP x          Other:      Treatment Charges: Mins Units   []  Modalities-     [x]  Ther Exercise 40 3   []  Manual Therapy     []

## 2024-06-26 ENCOUNTER — HOSPITAL ENCOUNTER (OUTPATIENT)
Dept: PHYSICAL THERAPY | Facility: CLINIC | Age: 64
Setting detail: THERAPIES SERIES
Discharge: HOME | End: 2024-06-26
Payer: MEDICAID

## 2024-06-26 PROCEDURE — 97110 THERAPEUTIC EXERCISES: CPT

## 2024-06-26 NOTE — FLOWSHEET NOTE
[]  Other     Total Billable time 41 3       Assessment: [x] Progressing toward goals. Completed all exercises above and reviewed exercises to continue at home. Improved flexion/abduction ROM with wall slides but fatigue noted. Administered blue thera-band to increased difficulty with UE strength program at home and advised to perform 1-2 times a day. Increased fatigue with all exercises. Focused on shoulder strength and stability exercises. Will plan to progress shoulder strengthening next visit.       [] No change      [] Other:    [x] Patient would continue to benefit from skilled physical therapy services in order to: decrease pain L shoulder, increase ROM L Shoulder, forearm, increase strength L UE, and improve computer use, ADLs, doing her hair, driving, household activities, standing tolerance, and work activities.         STG: (to be met in 10 treatments) - assessed on 5/6/24   ? Pain: L Shoulder 4/10 average pain, 6/10 at worst--average pain 1-2/10, worst 3/10   ? ROM: L Shoulder supine flex , abd 90°, ER 30°, IR 50° (@ 45° abd), pronation 50°, supination 75° to improve computer use, driving and ADLs---  PROM 25 ER , 145 flexion, 110 abduction, active ROM in objective section above   ? Strength: L Shoulder 3-/5, L elbow 3+/5, L  46 lbs, to improve driving, ADLs, and tolerance to standing--- left elbow 4+/5, to test shoulder strength at a future visit   ? Function: UEFI 64% loss of UE function---   Patient to be independent with home exercise program as demonstrated by performance with correct form without cues.--progress     LTG: (to be met in 20 treatments) to increased LTG to be met in 30 visits as of 5/6/24  ? Pain: L Shoulder 2/10 average pain, 4/10 at worst   ? ROM: L Shoulder supine flex, abd 120°, ER 50°, IR 60° (@ 90° abd), pronation 65°,  to improve driving, ADLs, and household activities   ? Strength: L Shoulder 3+/5, L elbow 4/5, L  54 lbs to improve household and work activities   ?

## 2024-06-27 DIAGNOSIS — I10 ESSENTIAL HYPERTENSION: ICD-10-CM

## 2024-06-27 RX ORDER — HYDROCHLOROTHIAZIDE 25 MG/1
TABLET ORAL
Qty: 90 TABLET | Refills: 0 | Status: SHIPPED | OUTPATIENT
Start: 2024-06-27

## 2024-07-02 ENCOUNTER — HOSPITAL ENCOUNTER (OUTPATIENT)
Dept: PHYSICAL THERAPY | Facility: CLINIC | Age: 64
Setting detail: THERAPIES SERIES
Discharge: HOME | End: 2024-07-02

## 2024-07-02 PROCEDURE — 97110 THERAPEUTIC EXERCISES: CPT
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
## 2024-07-05 ENCOUNTER — APPOINTMENT (OUTPATIENT)
Dept: PHYSICAL THERAPY | Facility: CLINIC | Age: 64
End: 2024-07-05

## 2024-07-11 ENCOUNTER — TELEPHONE (OUTPATIENT)
Dept: FAMILY MEDICINE CLINIC | Age: 64
End: 2024-07-11

## 2024-07-11 NOTE — TELEPHONE ENCOUNTER
Called patient to ask what medical condition [employer] form was being used for and she states she needs her air conditioning on for her hypertension.  When I told her we have filled the [employer] forms out for her but never for gas.  Became hostile on phone stating if we cannot do what she needs to have done that she isn't coming here anymore more and stated not to call her anymore.    Last gas form was signed by another physician 2 yrs ago

## 2025-04-02 DIAGNOSIS — I10 ESSENTIAL HYPERTENSION: ICD-10-CM

## 2025-04-02 RX ORDER — HYDROCHLOROTHIAZIDE 25 MG/1
TABLET ORAL
Qty: 90 TABLET | Refills: 0 | Status: SHIPPED | OUTPATIENT
Start: 2025-04-02

## 2025-06-11 ENCOUNTER — TELEPHONE (OUTPATIENT)
Dept: FAMILY MEDICINE CLINIC | Age: 65
End: 2025-06-11

## 2025-06-11 NOTE — TELEPHONE ENCOUNTER
PT called in wanting to know if we received the fax from vinayak lemus.    Before encounter I was having issues verifying the  with the annunciation of numbers  and pt grew very angry. I stated I saw nothing scanned in and would check with MA and she proceeded to get more angry and told me to let her know when its done.

## 2025-06-11 NOTE — TELEPHONE ENCOUNTER
Called and spoke to the patient- advised her of the encounter from a year ago and let her know that we have not seen her in the office in over a year. We need proper documentation and we need her to follow up for further refills on her BP medications or to complete the forms.   Advised her medication was sent for a 90 days supply in April but she will need an appt before her next refill.   Patient asked about the [employer] form for this year- I let her know it can be completed at her office visit next week.   Patient stated \"okay ill have to figure something else out before then but I do need to come in for further refills.\"   I confirmed the patients appt next week and the phone call was ended.

## 2025-06-12 NOTE — TELEPHONE ENCOUNTER
Called the patient back- she advised me they turned it back on and she has a 30 extension.   No longer needs it completed today.

## 2025-06-12 NOTE — TELEPHONE ENCOUNTER
Patient called the office requesting a call back about her paperwork to have her electric turned back on. Spoke to rafael and she is going to call her back .

## 2025-06-19 ENCOUNTER — OFFICE VISIT (OUTPATIENT)
Dept: FAMILY MEDICINE CLINIC | Age: 65
End: 2025-06-19

## 2025-06-19 VITALS
HEART RATE: 78 BPM | OXYGEN SATURATION: 98 % | SYSTOLIC BLOOD PRESSURE: 140 MMHG | DIASTOLIC BLOOD PRESSURE: 96 MMHG | BODY MASS INDEX: 28.58 KG/M2 | WEIGHT: 188.6 LBS | HEIGHT: 68 IN

## 2025-06-19 DIAGNOSIS — Z13.1 SCREENING FOR DIABETES MELLITUS: ICD-10-CM

## 2025-06-19 DIAGNOSIS — I10 ESSENTIAL HYPERTENSION: Primary | ICD-10-CM

## 2025-06-19 DIAGNOSIS — E78.2 MIXED HYPERLIPIDEMIA: ICD-10-CM

## 2025-06-19 PROCEDURE — 3080F DIAST BP >= 90 MM HG: CPT | Performed by: NURSE PRACTITIONER

## 2025-06-19 PROCEDURE — 99213 OFFICE O/P EST LOW 20 MIN: CPT | Performed by: NURSE PRACTITIONER

## 2025-06-19 PROCEDURE — 3077F SYST BP >= 140 MM HG: CPT | Performed by: NURSE PRACTITIONER

## 2025-06-19 RX ORDER — MULTIVITAMIN WITH IRON
1 TABLET ORAL DAILY
COMMUNITY

## 2025-06-19 RX ORDER — LOSARTAN POTASSIUM AND HYDROCHLOROTHIAZIDE 12.5; 5 MG/1; MG/1
1 TABLET ORAL DAILY
Qty: 90 TABLET | Refills: 1 | Status: SHIPPED | OUTPATIENT
Start: 2025-06-19

## 2025-06-19 SDOH — ECONOMIC STABILITY: FOOD INSECURITY: WITHIN THE PAST 12 MONTHS, YOU WORRIED THAT YOUR FOOD WOULD RUN OUT BEFORE YOU GOT MONEY TO BUY MORE.: SOMETIMES TRUE

## 2025-06-19 SDOH — ECONOMIC STABILITY: FOOD INSECURITY: WITHIN THE PAST 12 MONTHS, THE FOOD YOU BOUGHT JUST DIDN'T LAST AND YOU DIDN'T HAVE MONEY TO GET MORE.: SOMETIMES TRUE

## 2025-06-19 ASSESSMENT — PATIENT HEALTH QUESTIONNAIRE - PHQ9
2. FEELING DOWN, DEPRESSED OR HOPELESS: NOT AT ALL
SUM OF ALL RESPONSES TO PHQ QUESTIONS 1-9: 0
1. LITTLE INTEREST OR PLEASURE IN DOING THINGS: NOT AT ALL
SUM OF ALL RESPONSES TO PHQ QUESTIONS 1-9: 0

## 2025-06-19 ASSESSMENT — ENCOUNTER SYMPTOMS
SHORTNESS OF BREATH: 0
ABDOMINAL PAIN: 0
VOMITING: 0
CHEST TIGHTNESS: 0
NAUSEA: 0
COUGH: 0

## 2025-06-19 NOTE — PATIENT INSTRUCTIONS
[facility] Food Resources*  (Call [facility] for more resources)    [facility] Food Ministry  What they offer: Food pantry second and fourth Tuesday 4:30-6pm  Phone Number and Address: [phone] Toll Free: [employer] [place], between [place] and [employer]; [address] [facility] [address]  [facility] Office on Aging of [facility]  What they offer: “Meals & Nutrition” search option on website  Phone Number and Website: 681.544.6562; https://Bravofly/  [facility]  What they offer: Dinner is open to all (must be registered and in good standing) and three hot meals a day for shelter residents. Breakfast 7-8am, Lunch 12-1pm, and Dinner 5-6pm daily.  Phone and Address: [phone]; [address] [facility] [address]  [employer] Delivery program  What they offer: Food Box Delivery for those within [facility] who are homebound or without transportation. Applications done by phone. Qualifying individuals are eligible for 3 deliveries for the lifetime of the program.  Phone number: Call for eligibility check at 2-1-1 or 6-921-884Inspherion (7445)  [facility]  What they offer: Food Pantry second and fourth Saturday 1-5pm  Phone and Address: [phone]; [address] [address]  [employer] Mobile Food Pantries   What they offer: Mobile pantries throughout the [facility]. Anyone in need may visit one pantry per month.  Phone Number and Website: For locations and schedule call 2-1-1 or 7-696-972-HELP (2714) or check the website www.feed[place].org  [employer] [place] [employer] Char[employer]  What they offer: Food Pantry, call for schedule, open to All  Phone Number: [phone]  [facility]ping [facility]  What they offer: Hot meals, Breakfast: Monday, Wednesday, Friday 8-10am, Lunch: Monday-Friday 10am-12:30pm. Food pantry (serves [address] or east of [facility]) Tuesday
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
## 2025-06-19 NOTE — PROGRESS NOTES
Visit Information    Have you changed or started any medications since your last visit including any over-the-counter medicines, vitamins, or herbal medicines? no   Have you stopped taking any of your medications? Is so, why? -  no  Are you having any side effects from any of your medications? - no    Have you seen any other physician or provider since your last visit?  no   Have you had any other diagnostic tests since your last visit?  no   Have you been seen in the emergency room and/or had an admission in a hospital since we last saw you?  no   Have you had your routine dental cleaning in the past 6 months?  no     Do you have an active MyChart account? If no, what is the barrier?  Yes    Patient Care Team:  Jaz Irving APRN - CNP as PCP - General (Family Nurse Practitioner)  Jaz Irving APRN - CNP as PCP - Empaneled Provider  Emma Gonzalez MD as Consulting Physician (Endocrinology)    Medical History Review  Past Medical, Family, and Social History reviewed and  contribute to the patient presenting condition    Health Maintenance   Topic Date Due    Hepatitis C screen  Never done    DTaP/Tdap/Td vaccine (1 - Tdap) Never done    Colorectal Cancer Screen  Never done    Shingles vaccine (1 of 2) Never done    Pneumococcal 50+ years Vaccine (1 of 1 - PCV) Never done    Breast cancer screen  07/07/2017    COVID-19 Vaccine (1 - 2024-25 season) Never done    Depression Screen  02/27/2025    Flu vaccine (Season Ended) 08/01/2025    Lipids  08/15/2028    Respiratory Syncytial Virus (RSV) Pregnant or age 60 yrs+ (1 - 1-dose 75+ series) 12/30/2035    HIV screen  Completed    Hepatitis A vaccine  Aged Out    Hepatitis B vaccine  Aged Out    Hib vaccine  Aged Out    Polio vaccine  Aged Out    Meningococcal (ACWY) vaccine  Aged Out    Meningococcal B vaccine  Aged Out    Diabetes screen  Discontinued    Cervical cancer screen  Discontinued